# Patient Record
Sex: FEMALE
[De-identification: names, ages, dates, MRNs, and addresses within clinical notes are randomized per-mention and may not be internally consistent; named-entity substitution may affect disease eponyms.]

---

## 2019-05-21 ENCOUNTER — HOSPITAL ENCOUNTER (OUTPATIENT)
Age: 49
End: 2019-05-21
Payer: MEDICAID

## 2019-05-21 DIAGNOSIS — H53.8: Primary | ICD-10-CM

## 2019-05-21 DIAGNOSIS — Z82.49: ICD-10-CM

## 2019-05-21 DIAGNOSIS — R51: ICD-10-CM

## 2019-05-21 PROCEDURE — 70544 MR ANGIOGRAPHY HEAD W/O DYE: CPT

## 2023-09-19 ENCOUNTER — TELEPHONE (OUTPATIENT)
Dept: PRIMARY CARE | Facility: CLINIC | Age: 53
End: 2023-09-19
Payer: COMMERCIAL

## 2023-09-19 DIAGNOSIS — R79.89 ELEVATED FERRITIN: ICD-10-CM

## 2023-09-19 DIAGNOSIS — E78.1 HYPERTRIGLYCERIDEMIA: ICD-10-CM

## 2023-09-19 DIAGNOSIS — R79.89 LOW VITAMIN B12 LEVEL: Primary | ICD-10-CM

## 2023-09-22 ENCOUNTER — LAB (OUTPATIENT)
Dept: LAB | Facility: LAB | Age: 53
End: 2023-09-22
Payer: COMMERCIAL

## 2023-09-22 DIAGNOSIS — E78.1 HYPERTRIGLYCERIDEMIA: ICD-10-CM

## 2023-09-22 DIAGNOSIS — R79.89 ELEVATED FERRITIN: ICD-10-CM

## 2023-09-22 DIAGNOSIS — R79.89 LOW VITAMIN B12 LEVEL: ICD-10-CM

## 2023-09-22 LAB
ALANINE AMINOTRANSFERASE (SGPT) (U/L) IN SER/PLAS: 67 U/L (ref 7–45)
ALBUMIN (G/DL) IN SER/PLAS: 4.3 G/DL (ref 3.4–5)
ALKALINE PHOSPHATASE (U/L) IN SER/PLAS: 128 U/L (ref 33–110)
ANION GAP IN SER/PLAS: 12 MMOL/L (ref 10–20)
ASPARTATE AMINOTRANSFERASE (SGOT) (U/L) IN SER/PLAS: 37 U/L (ref 9–39)
BASOPHILS (10*3/UL) IN BLOOD BY AUTOMATED COUNT: 0.06 X10E9/L (ref 0–0.1)
BASOPHILS/100 LEUKOCYTES IN BLOOD BY AUTOMATED COUNT: 1 % (ref 0–2)
BILIRUBIN TOTAL (MG/DL) IN SER/PLAS: 0.5 MG/DL (ref 0–1.2)
CALCIUM (MG/DL) IN SER/PLAS: 9.1 MG/DL (ref 8.6–10.3)
CARBON DIOXIDE, TOTAL (MMOL/L) IN SER/PLAS: 26 MMOL/L (ref 21–32)
CHLORIDE (MMOL/L) IN SER/PLAS: 107 MMOL/L (ref 98–107)
CHOLESTEROL (MG/DL) IN SER/PLAS: 168 MG/DL (ref 0–199)
CHOLESTEROL IN HDL (MG/DL) IN SER/PLAS: 58 MG/DL
CHOLESTEROL/HDL RATIO: 2.9
COBALAMIN (VITAMIN B12) (PG/ML) IN SER/PLAS: 872 PG/ML (ref 211–911)
CREATININE (MG/DL) IN SER/PLAS: 0.57 MG/DL (ref 0.5–1.05)
EOSINOPHILS (10*3/UL) IN BLOOD BY AUTOMATED COUNT: 0.26 X10E9/L (ref 0–0.7)
EOSINOPHILS/100 LEUKOCYTES IN BLOOD BY AUTOMATED COUNT: 4.5 % (ref 0–6)
ERYTHROCYTE DISTRIBUTION WIDTH (RATIO) BY AUTOMATED COUNT: 12 % (ref 11.5–14.5)
ERYTHROCYTE MEAN CORPUSCULAR HEMOGLOBIN CONCENTRATION (G/DL) BY AUTOMATED: 32.5 G/DL (ref 32–36)
ERYTHROCYTE MEAN CORPUSCULAR VOLUME (FL) BY AUTOMATED COUNT: 96 FL (ref 80–100)
ERYTHROCYTES (10*6/UL) IN BLOOD BY AUTOMATED COUNT: 4.38 X10E12/L (ref 4–5.2)
FERRITIN (UG/LL) IN SER/PLAS: 179 UG/L (ref 8–150)
GFR FEMALE: >90 ML/MIN/1.73M2
GLUCOSE (MG/DL) IN SER/PLAS: 98 MG/DL (ref 74–99)
HEMATOCRIT (%) IN BLOOD BY AUTOMATED COUNT: 41.9 % (ref 36–46)
HEMOGLOBIN (G/DL) IN BLOOD: 13.6 G/DL (ref 12–16)
IMMATURE GRANULOCYTES/100 LEUKOCYTES IN BLOOD BY AUTOMATED COUNT: 0.2 % (ref 0–0.9)
IRON (UG/DL) IN SER/PLAS: 72 UG/DL (ref 35–150)
IRON BINDING CAPACITY (UG/DL) IN SER/PLAS: 301 UG/DL (ref 240–445)
IRON SATURATION (%) IN SER/PLAS: 24 % (ref 25–45)
LDL: 94 MG/DL (ref 0–99)
LEUKOCYTES (10*3/UL) IN BLOOD BY AUTOMATED COUNT: 5.7 X10E9/L (ref 4.4–11.3)
LYMPHOCYTES (10*3/UL) IN BLOOD BY AUTOMATED COUNT: 2.12 X10E9/L (ref 1.2–4.8)
LYMPHOCYTES/100 LEUKOCYTES IN BLOOD BY AUTOMATED COUNT: 37.1 % (ref 13–44)
MAGNESIUM (MG/DL) IN SER/PLAS: 1.92 MG/DL (ref 1.6–2.4)
MONOCYTES (10*3/UL) IN BLOOD BY AUTOMATED COUNT: 0.56 X10E9/L (ref 0.1–1)
MONOCYTES/100 LEUKOCYTES IN BLOOD BY AUTOMATED COUNT: 9.8 % (ref 2–10)
NEUTROPHILS (10*3/UL) IN BLOOD BY AUTOMATED COUNT: 2.71 X10E9/L (ref 1.2–7.7)
NEUTROPHILS/100 LEUKOCYTES IN BLOOD BY AUTOMATED COUNT: 47.4 % (ref 40–80)
PLATELETS (10*3/UL) IN BLOOD AUTOMATED COUNT: 313 X10E9/L (ref 150–450)
POTASSIUM (MMOL/L) IN SER/PLAS: 4.6 MMOL/L (ref 3.5–5.3)
PROTEIN TOTAL: 6.6 G/DL (ref 6.4–8.2)
SODIUM (MMOL/L) IN SER/PLAS: 140 MMOL/L (ref 136–145)
THYROTROPIN (MIU/L) IN SER/PLAS BY DETECTION LIMIT <= 0.05 MIU/L: 1.27 MIU/L (ref 0.44–3.98)
THYROXINE (T4) FREE (NG/DL) IN SER/PLAS: 0.82 NG/DL (ref 0.61–1.12)
TRIGLYCERIDE (MG/DL) IN SER/PLAS: 79 MG/DL (ref 0–149)
UREA NITROGEN (MG/DL) IN SER/PLAS: 15 MG/DL (ref 6–23)
VLDL: 16 MG/DL (ref 0–40)

## 2023-09-22 PROCEDURE — 83550 IRON BINDING TEST: CPT

## 2023-09-22 PROCEDURE — 85025 COMPLETE CBC W/AUTO DIFF WBC: CPT

## 2023-09-22 PROCEDURE — 84439 ASSAY OF FREE THYROXINE: CPT

## 2023-09-22 PROCEDURE — 36415 COLL VENOUS BLD VENIPUNCTURE: CPT

## 2023-09-22 PROCEDURE — 80061 LIPID PANEL: CPT

## 2023-09-22 PROCEDURE — 82607 VITAMIN B-12: CPT

## 2023-09-22 PROCEDURE — 83540 ASSAY OF IRON: CPT

## 2023-09-22 PROCEDURE — 84443 ASSAY THYROID STIM HORMONE: CPT

## 2023-09-22 PROCEDURE — 83735 ASSAY OF MAGNESIUM: CPT

## 2023-09-22 PROCEDURE — 80053 COMPREHEN METABOLIC PANEL: CPT

## 2023-09-22 PROCEDURE — 82728 ASSAY OF FERRITIN: CPT

## 2023-09-25 PROBLEM — F41.9 ANXIETY: Status: ACTIVE | Noted: 2023-09-25

## 2023-09-25 PROBLEM — K59.09 CHRONIC CONSTIPATION: Status: ACTIVE | Noted: 2021-12-13

## 2023-09-25 PROBLEM — N95.0 POSTMENOPAUSAL BLEEDING: Status: RESOLVED | Noted: 2021-12-13 | Resolved: 2023-09-25

## 2023-09-25 PROBLEM — M17.0 PRIMARY OSTEOARTHRITIS OF BOTH KNEES: Status: RESOLVED | Noted: 2019-07-01 | Resolved: 2023-09-25

## 2023-09-25 PROBLEM — M79.671 BILATERAL FOOT PAIN: Status: RESOLVED | Noted: 2019-07-01 | Resolved: 2023-09-25

## 2023-09-25 PROBLEM — M25.562 BILATERAL KNEE PAIN: Status: RESOLVED | Noted: 2019-07-01 | Resolved: 2023-09-25

## 2023-09-25 PROBLEM — M16.12 PRIMARY OSTEOARTHRITIS OF LEFT HIP: Status: RESOLVED | Noted: 2022-03-02 | Resolved: 2023-09-25

## 2023-09-25 PROBLEM — M51.369 LUMBAR DEGENERATIVE DISC DISEASE: Status: RESOLVED | Noted: 2019-07-01 | Resolved: 2023-09-25

## 2023-09-25 PROBLEM — M54.31 RIGHT SIDED SCIATICA: Status: RESOLVED | Noted: 2023-09-25 | Resolved: 2023-09-25

## 2023-09-25 PROBLEM — Z91.199 NONCOMPLIANCE: Status: RESOLVED | Noted: 2020-09-16 | Resolved: 2023-09-25

## 2023-09-25 PROBLEM — M25.561 BILATERAL KNEE PAIN: Status: RESOLVED | Noted: 2019-07-01 | Resolved: 2023-09-25

## 2023-09-25 PROBLEM — L81.1 CHLOASMA: Status: RESOLVED | Noted: 2019-07-24 | Resolved: 2023-09-25

## 2023-09-25 PROBLEM — R53.83 FATIGUE: Status: RESOLVED | Noted: 2019-07-01 | Resolved: 2023-09-25

## 2023-09-25 PROBLEM — R74.8 ELEVATED VITAMIN B12 LEVEL: Status: RESOLVED | Noted: 2023-09-25 | Resolved: 2023-09-25

## 2023-09-25 PROBLEM — M79.672 BILATERAL FOOT PAIN: Status: RESOLVED | Noted: 2019-07-01 | Resolved: 2023-09-25

## 2023-09-25 PROBLEM — M94.9 DISORDER OF BONE AND CARTILAGE: Status: ACTIVE | Noted: 2019-07-01

## 2023-09-25 PROBLEM — M79.642 BILATERAL HAND PAIN: Status: RESOLVED | Noted: 2019-07-01 | Resolved: 2023-09-25

## 2023-09-25 PROBLEM — M06.09 RHEUMATOID ARTHRITIS OF MULTIPLE SITES WITH NEGATIVE RHEUMATOID FACTOR (MULTI): Status: RESOLVED | Noted: 2022-03-02 | Resolved: 2023-09-25

## 2023-09-25 PROBLEM — R10.9 CHRONIC ABDOMINAL PAIN: Status: RESOLVED | Noted: 2023-09-25 | Resolved: 2023-09-25

## 2023-09-25 PROBLEM — M54.50 CHRONIC LOW BACK PAIN: Status: RESOLVED | Noted: 2021-12-13 | Resolved: 2023-09-25

## 2023-09-25 PROBLEM — R79.89 ELEVATED FERRITIN: Status: RESOLVED | Noted: 2023-09-25 | Resolved: 2023-09-25

## 2023-09-25 PROBLEM — M79.641 BILATERAL HAND PAIN: Status: RESOLVED | Noted: 2019-07-01 | Resolved: 2023-09-25

## 2023-09-25 PROBLEM — R73.9 HYPERGLYCEMIA: Status: RESOLVED | Noted: 2019-07-01 | Resolved: 2023-09-25

## 2023-09-25 PROBLEM — J30.9 ALLERGIC RHINITIS: Status: ACTIVE | Noted: 2021-12-13

## 2023-09-25 PROBLEM — M89.9 DISORDER OF BONE AND CARTILAGE: Status: ACTIVE | Noted: 2019-07-01

## 2023-09-25 PROBLEM — R79.89 ELEVATED VITAMIN B12 LEVEL: Status: RESOLVED | Noted: 2023-09-25 | Resolved: 2023-09-25

## 2023-09-25 PROBLEM — E66.811 OBESITY (BMI 30.0-34.9): Status: ACTIVE | Noted: 2019-07-01

## 2023-09-25 PROBLEM — H66.91 ACUTE RIGHT OTITIS MEDIA: Status: RESOLVED | Noted: 2021-12-13 | Resolved: 2023-09-25

## 2023-09-25 PROBLEM — E66.9 OBESITY (BMI 30.0-34.9): Status: ACTIVE | Noted: 2019-07-01

## 2023-09-25 PROBLEM — R87.615 CERVICOVAGINAL CYTOLOGY SPECIMEN UNSATISFACTORY: Status: RESOLVED | Noted: 2023-09-25 | Resolved: 2023-09-25

## 2023-09-25 PROBLEM — M54.16 LUMBAR RADICULOPATHY: Status: RESOLVED | Noted: 2023-09-25 | Resolved: 2023-09-25

## 2023-09-25 PROBLEM — M25.532 BILATERAL WRIST PAIN: Status: RESOLVED | Noted: 2019-07-01 | Resolved: 2023-09-25

## 2023-09-25 PROBLEM — Z96.641 H/O TOTAL HIP ARTHROPLASTY, RIGHT: Status: RESOLVED | Noted: 2022-03-02 | Resolved: 2023-09-25

## 2023-09-25 PROBLEM — I65.29 STENOSIS OF CAROTID ARTERY: Status: ACTIVE | Noted: 2021-12-13

## 2023-09-25 PROBLEM — H04.123 DRY EYES: Status: RESOLVED | Noted: 2022-03-02 | Resolved: 2023-09-25

## 2023-09-25 PROBLEM — K76.0 FATTY LIVER: Status: RESOLVED | Noted: 2019-07-01 | Resolved: 2023-09-25

## 2023-09-25 PROBLEM — M79.7 FIBROMYALGIA: Status: RESOLVED | Noted: 2023-09-25 | Resolved: 2023-09-25

## 2023-09-25 PROBLEM — M51.36 LUMBAR DEGENERATIVE DISC DISEASE: Status: RESOLVED | Noted: 2019-07-01 | Resolved: 2023-09-25

## 2023-09-25 PROBLEM — M76.71 PERONEAL TENDONITIS OF RIGHT LOWER EXTREMITY: Status: RESOLVED | Noted: 2021-09-03 | Resolved: 2023-09-25

## 2023-09-25 PROBLEM — F41.1 GENERALIZED ANXIETY DISORDER: Status: ACTIVE | Noted: 2021-12-13

## 2023-09-25 PROBLEM — G89.29 CHRONIC LOW BACK PAIN: Status: RESOLVED | Noted: 2021-12-13 | Resolved: 2023-09-25

## 2023-09-25 PROBLEM — K75.81 NASH (NONALCOHOLIC STEATOHEPATITIS): Status: RESOLVED | Noted: 2019-07-01 | Resolved: 2023-09-25

## 2023-09-25 PROBLEM — M25.551 BILATERAL HIP PAIN: Status: RESOLVED | Noted: 2019-07-01 | Resolved: 2023-09-25

## 2023-09-25 PROBLEM — M25.552 BILATERAL HIP PAIN: Status: RESOLVED | Noted: 2019-07-01 | Resolved: 2023-09-25

## 2023-09-25 PROBLEM — K64.4 EXTERNAL HEMORRHOIDS: Status: RESOLVED | Noted: 2021-12-13 | Resolved: 2023-09-25

## 2023-09-25 PROBLEM — E78.1 HYPERTRIGLYCERIDEMIA: Status: RESOLVED | Noted: 2021-12-13 | Resolved: 2023-09-25

## 2023-09-25 PROBLEM — I49.1 PREMATURE ATRIAL CONTRACTION: Status: RESOLVED | Noted: 2021-12-13 | Resolved: 2023-09-25

## 2023-09-25 PROBLEM — M25.531 BILATERAL WRIST PAIN: Status: RESOLVED | Noted: 2019-07-01 | Resolved: 2023-09-25

## 2023-09-25 PROBLEM — M54.10 BILATERAL RADIATING LEG PAIN: Status: RESOLVED | Noted: 2023-09-25 | Resolved: 2023-09-25

## 2023-09-25 PROBLEM — G89.29 CHRONIC ABDOMINAL PAIN: Status: RESOLVED | Noted: 2023-09-25 | Resolved: 2023-09-25

## 2023-09-25 PROBLEM — M16.11 OSTEOARTHRITIS OF RIGHT HIP: Status: RESOLVED | Noted: 2023-09-25 | Resolved: 2023-09-25

## 2023-09-25 PROBLEM — R79.89 ELEVATED LFTS: Status: RESOLVED | Noted: 2023-09-25 | Resolved: 2023-09-25

## 2023-09-25 PROBLEM — R89.8 ABNORMAL CHROMOSOMAL ANALYSIS: Status: ACTIVE | Noted: 2023-09-25

## 2023-09-25 RX ORDER — CYCLOBENZAPRINE HCL 10 MG
1 TABLET ORAL 2 TIMES DAILY PRN
COMMUNITY
Start: 2021-02-17

## 2023-09-25 RX ORDER — HYDROXYCHLOROQUINE SULFATE 200 MG/1
1 TABLET, FILM COATED ORAL 2 TIMES DAILY
COMMUNITY
Start: 2019-09-06

## 2023-09-25 RX ORDER — FLUTICASONE PROPIONATE 50 MCG
1 SPRAY, SUSPENSION (ML) NASAL DAILY
COMMUNITY
Start: 2019-05-03

## 2023-09-25 RX ORDER — LANSOPRAZOLE 30 MG/1
1 CAPSULE, DELAYED RELEASE ORAL DAILY
COMMUNITY
Start: 2019-05-20 | End: 2024-01-02 | Stop reason: SDUPTHER

## 2023-09-25 RX ORDER — SULINDAC 200 MG/1
200 TABLET ORAL EVERY 12 HOURS
COMMUNITY
Start: 2019-07-01

## 2023-09-25 RX ORDER — DOCUSATE SODIUM 100 MG/1
100 CAPSULE, LIQUID FILLED ORAL 2 TIMES DAILY
COMMUNITY
Start: 2021-12-28

## 2023-09-28 ENCOUNTER — APPOINTMENT (OUTPATIENT)
Dept: PRIMARY CARE | Facility: CLINIC | Age: 53
End: 2023-09-28
Payer: COMMERCIAL

## 2023-09-29 ENCOUNTER — OFFICE VISIT (OUTPATIENT)
Dept: PRIMARY CARE | Facility: CLINIC | Age: 53
End: 2023-09-29
Payer: COMMERCIAL

## 2023-09-29 VITALS
OXYGEN SATURATION: 96 % | WEIGHT: 146 LBS | HEART RATE: 63 BPM | DIASTOLIC BLOOD PRESSURE: 80 MMHG | SYSTOLIC BLOOD PRESSURE: 122 MMHG | HEIGHT: 60 IN | BODY MASS INDEX: 28.66 KG/M2

## 2023-09-29 DIAGNOSIS — B35.1 ONYCHOMYCOSIS: Primary | ICD-10-CM

## 2023-09-29 DIAGNOSIS — F41.1 GENERALIZED ANXIETY DISORDER: ICD-10-CM

## 2023-09-29 PROCEDURE — 99214 OFFICE O/P EST MOD 30 MIN: CPT | Performed by: NURSE PRACTITIONER

## 2023-09-29 PROCEDURE — 1036F TOBACCO NON-USER: CPT | Performed by: NURSE PRACTITIONER

## 2023-09-29 RX ORDER — TERBINAFINE HYDROCHLORIDE 250 MG/1
250 TABLET ORAL DAILY
Qty: 84 TABLET | Refills: 0 | Status: SHIPPED | OUTPATIENT
Start: 2023-09-29 | End: 2023-12-22

## 2023-09-29 ASSESSMENT — ENCOUNTER SYMPTOMS
CHOKING: 0
NECK PAIN: 0
NERVOUS/ANXIOUS: 0
DYSURIA: 0
SPEECH DIFFICULTY: 0
EYE PAIN: 0
DIZZINESS: 0
EYE REDNESS: 0
PHOTOPHOBIA: 0
SORE THROAT: 0
FATIGUE: 0
VOMITING: 0
CONSTIPATION: 0
APNEA: 0
PALPITATIONS: 0
FREQUENCY: 0
BLOOD IN STOOL: 0
CHILLS: 0
HEMATURIA: 0
WEAKNESS: 0
TROUBLE SWALLOWING: 0
NAUSEA: 0
FLANK PAIN: 0
CHEST TIGHTNESS: 0
DIARRHEA: 0
ABDOMINAL DISTENTION: 0
SLEEP DISTURBANCE: 0
CONFUSION: 0
BACK PAIN: 0
ABDOMINAL PAIN: 0
JOINT SWELLING: 0
COUGH: 0
WHEEZING: 0
SHORTNESS OF BREATH: 0
ARTHRALGIAS: 0
WOUND: 0
UNEXPECTED WEIGHT CHANGE: 0
SEIZURES: 0
DIFFICULTY URINATING: 0
NUMBNESS: 0
HEADACHES: 0
MYALGIAS: 0
FEVER: 0
FACIAL ASYMMETRY: 0

## 2023-09-29 ASSESSMENT — PATIENT HEALTH QUESTIONNAIRE - PHQ9
2. FEELING DOWN, DEPRESSED OR HOPELESS: NOT AT ALL
SUM OF ALL RESPONSES TO PHQ9 QUESTIONS 1 AND 2: 0
1. LITTLE INTEREST OR PLEASURE IN DOING THINGS: NOT AT ALL

## 2023-09-29 NOTE — PROGRESS NOTES
Subjective   Patient ID: Irma D Reyes is a 53 y.o. female who presents for Nail Problem (X a few months ).      HPI:  Presents today for [facility] LAB FU. C/O FINGERNAIL  modifying factors consists of SHE WENT TO URGENT CARE AND DX WITH  associated symptoms consist of CRACKED AND BRITTLE NAILS  prior treatment consists of medication OTC TOPICAL       LIVER ENZYMES- STATES SHE HAS A FATTY LIVER. REFUSING ABD US OR REPEAT LABS AT THIS TIME    Visit Vitals  /80 (BP Location: Right arm, Patient Position: Sitting)   Pulse 63   Ht 1.524 m (5')   Wt 66.2 kg (146 lb)   LMP  (LMP Unknown)   SpO2 96%   BMI 28.51 kg/m²   OB Status Perimenopausal   Smoking Status Never   BSA 1.67 m²        Review of Systems   Constitutional:  Negative for chills, fatigue, fever and unexpected weight change.   HENT:  Negative for congestion, ear pain, sore throat and trouble swallowing.    Eyes:  Negative for photophobia, pain, redness and visual disturbance.   Respiratory:  Negative for apnea, cough, choking, chest tightness, shortness of breath and wheezing.    Cardiovascular:  Negative for chest pain, palpitations and leg swelling.   Gastrointestinal:  Negative for abdominal distention, abdominal pain, blood in stool, constipation, diarrhea, nausea and vomiting.   Genitourinary:  Negative for difficulty urinating, dysuria, flank pain, frequency, hematuria and urgency.   Musculoskeletal:  Negative for arthralgias, back pain, gait problem, joint swelling, myalgias and neck pain.   Skin:  Negative for rash and wound.   Neurological:  Negative for dizziness, seizures, syncope, facial asymmetry, speech difficulty, weakness, numbness and headaches.   Psychiatric/Behavioral:  Negative for confusion, sleep disturbance and suicidal ideas. The patient is not nervous/anxious.        Objective   Component      Latest Ref Rng 9/22/2023   WBC      4.4 - 11.3 x10E9/L 5.7    RBC      4.00 - 5.20 x10E12/L 4.38    HEMOGLOBIN      12.0 - 16.0 g/dL 13.6     HEMATOCRIT      36.0 - 46.0 % 41.9    MCV      80 - 100 fL 96    MCHC      32.0 - 36.0 g/dL 32.5    Platelets      150 - 450 x10E9/L 313    RED CELL DISTRIBUTION WIDTH      11.5 - 14.5 % 12.0    Neutrophils %      40.0 - 80.0 % 47.4    Immature Granulocytes %, Automated      0.0 - 0.9 % 0.2    Lymphocytes %      13.0 - 44.0 % 37.1    Monocytes %      2.0 - 10.0 % 9.8    Eosinophils %      0.0 - 6.0 % 4.5    Basophils %      0.0 - 2.0 % 1.0    Neutrophils Absolute      1.20 - 7.70 x10E9/L 2.71    Lymphocytes Absolute      1.20 - 4.80 x10E9/L 2.12    Monocytes Absolute      0.10 - 1.00 x10E9/L 0.56    Eosinophils Absolute      0.00 - 0.70 x10E9/L 0.26    Basophils Absolute      0.00 - 0.10 x10E9/L 0.06    GLUCOSE      74 - 99 mg/dL 98    SODIUM      136 - 145 mmol/L 140    POTASSIUM      3.5 - 5.3 mmol/L 4.6    CHLORIDE      98 - 107 mmol/L 107    Bicarbonate      21 - 32 mmol/L 26    Anion Gap      10 - 20 mmol/L 12    Blood Urea Nitrogen      6 - 23 mg/dL 15    Creatinine      0.50 - 1.05 mg/dL 0.57    GFR Female      >90 mL/min/1.73m2 >90    Calcium      8.6 - 10.3 mg/dL 9.1    Albumin      3.4 - 5.0 g/dL 4.3    Alkaline Phosphatase      33 - 110 U/L 128 (H)    Total Protein      6.4 - 8.2 g/dL 6.6    AST      9 - 39 U/L 37    Bilirubin Total      0.0 - 1.2 mg/dL 0.5    ALT      7 - 45 U/L 67 (H)    CHOLESTEROL      0 - 199 mg/dL 168    HDL CHOLESTEROL      mg/dL 58.0    Cholesterol/HDL Ratio 2.9    LDL      0 - 99 mg/dL 94    VLDL      0 - 40 mg/dL 16    TRIGLYCERIDES      0 - 149 mg/dL 79    IRON      35 - 150 ug/dL 72    TIBC      240 - 445 ug/dL 301    % Saturation      25 - 45 % 24 (L)    Thyroid Stimulating Hormone      0.44 - 3.98 mIU/L 1.27    Thyroxine, Free      0.61 - 1.12 ng/dL 0.82    FERRITIN      8 - 150 ug/L 179 (H)    MAGNESIUM      1.60 - 2.40 mg/dL 1.92    Vitamin B12      211 - 911 pg/mL 872       Legend:  (H) High  (L) Low  Physical Exam  Constitutional:       Appearance: Normal appearance.  She is normal weight.   HENT:      Head: Normocephalic.   Eyes:      Extraocular Movements: Extraocular movements intact.      Conjunctiva/sclera: Conjunctivae normal.      Pupils: Pupils are equal, round, and reactive to light.   Cardiovascular:      Rate and Rhythm: Normal rate and regular rhythm.      Pulses: Normal pulses.      Heart sounds: Normal heart sounds.   Pulmonary:      Effort: Pulmonary effort is normal.      Breath sounds: Normal breath sounds.   Musculoskeletal:         General: Normal range of motion.      Cervical back: Normal range of motion.   Skin:     General: Skin is warm and dry.      Comments: BRITTLE NAILS. CRACKED. YELLOWISH IN COLOR    Neurological:      General: No focal deficit present.      Mental Status: She is alert and oriented to person, place, and time.   Psychiatric:         Mood and Affect: Mood normal.         Behavior: Behavior normal.         Thought Content: Thought content normal.         Judgment: Judgment normal.            Assessment/Plan   Problem List Items Addressed This Visit       Onychomycosis - Primary    Relevant Medications    terbinafine (LamISIL) 250 mg tablet       WE DISCUSSED MOST COMMON SIDE EFFECTS OF PRESCRIBED MEDICATIONS. INDICATIONS, RISK, COMPLICATIONS, AND ALTERNATIVES OF MEDICATION/THERAPEUTICS WERE EXPLAINED AND DISCUSSED. PLEASE MONITOR CLOSELY FOR ANY UNTOWARD SIDE EFFECTS OR COMPLICATIONS OF MEDICATIONS. PATIENT IS STRONGLY ADVISED TO BE COMPLIANT WITH RECOMMENDATIONS. QUESTIONS AND CONCERNS WERE ADDRESSED. INSTRUCTED TO CALL, RETURN SOONER, OR GO TO THE ER,  IF SYMPTOMS PERSIST OR WORSEN. THEY VOICED UNDERSTANDING AND  DENIES FURTHER QUESTIONS AT THIS TIME.  .  TIME CODE  1. PREPARATION FOR PATIENT'S VISIT (REVIEWING CHART, CURRENT MEDICAL RECORDS, OUTSIDE HEALTH PROVIDER RECORDS, PREVIOUS HISTORY, EXAM, TEST, PROCEDURE, AND MEDICATIONS)  2. FACE TO FACE ENCOUNTER OBTAINING HISTORY FROM THE PATIENT/FAMILY/CAREGIVERS; PERFORMING EVALUATION AND  EXAMINATION; ORDERING TESTS OR PROCEDURES; REFERRING AND COMMUNICATING WITH OTHER HEALTHCARE PROVIDERS; COUNSELING AND EDUCATION OF THE PATIENT/FAMILY/CAREGIVERS; INDEPENDENTLY INTERPRETING RESULTS (TESTS, LABS, PROCEDURES, IMAGING) AND COMMUNICATING AND EXPLAINING RESULTS TO THE PATIENT/FAMILY/CAREGIVERS  3. COORDINATION OF CARE; PREPARING AND PRINTING DISCHARGE INSTRUCTIONS AND ANY EDUCATIONAL MATERIAL FOR THE PATIENT/FAMILY/CAREGIVERS. DOCUMENTING CLINICAL INFORMATION IN THE ELECTRONIC MEDICAL RECORD   4. REVIEWING OARRS AS NEEDED      MDM  1) COMPLEXITY: MORE THAN 1 STABLE CHRONIC CONDITION ADDRESSED OR 1 ACUTE ILLNESS ADDRESSED   2)DATA: TESTS INTERPRETED AND OR ORDERED, TOOK INDEPENDENT HISTORY OR RECORDS REVIEWED  3)RISK: MODERATE RISK DUE TO NATURE OF MEDICAL CONDITIONS/COMORBIDITY OR MEDICATIONS ORDERED OR SURGICAL OR PROCEDURE REFERRAL    12 MONTHS WITH RM

## 2023-11-01 NOTE — TELEPHONE ENCOUNTER
She has not been seen in almost 2 years   I just put in basic lab orders - fasting   Thanks    Alan Craft decreased balance during turns/decreased step length/decreased weight-shifting ability

## 2024-01-02 ENCOUNTER — OFFICE VISIT (OUTPATIENT)
Dept: PRIMARY CARE | Facility: CLINIC | Age: 54
End: 2024-01-02
Payer: COMMERCIAL

## 2024-01-02 VITALS
BODY MASS INDEX: 30.04 KG/M2 | SYSTOLIC BLOOD PRESSURE: 124 MMHG | HEART RATE: 70 BPM | HEIGHT: 60 IN | WEIGHT: 153 LBS | DIASTOLIC BLOOD PRESSURE: 82 MMHG

## 2024-01-02 DIAGNOSIS — R22.2 MASS OF BUTTOCK: Primary | ICD-10-CM

## 2024-01-02 DIAGNOSIS — B35.1 ONYCHOMYCOSIS: ICD-10-CM

## 2024-01-02 DIAGNOSIS — Z71.3 ENCOUNTER FOR DIETARY COUNSELING AND SURVEILLANCE: ICD-10-CM

## 2024-01-02 DIAGNOSIS — K76.0 FATTY LIVER: ICD-10-CM

## 2024-01-02 DIAGNOSIS — E66.3 OVERWEIGHT (BMI 25.0-29.9): ICD-10-CM

## 2024-01-02 DIAGNOSIS — K21.9 GASTROESOPHAGEAL REFLUX DISEASE, UNSPECIFIED WHETHER ESOPHAGITIS PRESENT: ICD-10-CM

## 2024-01-02 DIAGNOSIS — M25.551 RIGHT HIP PAIN: ICD-10-CM

## 2024-01-02 PROCEDURE — 1036F TOBACCO NON-USER: CPT | Performed by: PHYSICIAN ASSISTANT

## 2024-01-02 PROCEDURE — 99214 OFFICE O/P EST MOD 30 MIN: CPT | Performed by: PHYSICIAN ASSISTANT

## 2024-01-02 RX ORDER — LANSOPRAZOLE 30 MG/1
30 CAPSULE, DELAYED RELEASE ORAL DAILY
Qty: 30 CAPSULE | Refills: 5 | Status: SHIPPED | OUTPATIENT
Start: 2024-01-02

## 2024-01-02 RX ORDER — SEMAGLUTIDE 0.25 MG/.5ML
0.25 INJECTION, SOLUTION SUBCUTANEOUS
Qty: 2 ML | Refills: 1 | Status: SHIPPED | OUTPATIENT
Start: 2024-01-02 | End: 2024-03-05 | Stop reason: ALTCHOICE

## 2024-01-02 ASSESSMENT — ENCOUNTER SYMPTOMS
ARTHRALGIAS: 1
PALPITATIONS: 0
SHORTNESS OF BREATH: 0
NAUSEA: 0
DIARRHEA: 0
EYE DISCHARGE: 0
CONSTIPATION: 0
SLEEP DISTURBANCE: 0
NECK PAIN: 0
CONFUSION: 0
BACK PAIN: 0
EYE REDNESS: 0
HEADACHES: 0
BRUISES/BLEEDS EASILY: 0
COUGH: 0
FEVER: 0
TREMORS: 0
DIZZINESS: 0
FLANK PAIN: 0
FATIGUE: 1
WOUND: 0
FREQUENCY: 0
RHINORRHEA: 0
SINUS PAIN: 0
CHILLS: 0
VOMITING: 0
WHEEZING: 0
SORE THROAT: 0
ABDOMINAL PAIN: 0
NUMBNESS: 0
CHEST TIGHTNESS: 0

## 2024-01-02 ASSESSMENT — PATIENT HEALTH QUESTIONNAIRE - PHQ9
SUM OF ALL RESPONSES TO PHQ9 QUESTIONS 1 AND 2: 0
2. FEELING DOWN, DEPRESSED OR HOPELESS: NOT AT ALL
1. LITTLE INTEREST OR PLEASURE IN DOING THINGS: NOT AT ALL

## 2024-01-02 NOTE — PROGRESS NOTES
Subjective   Patient ID: Irma D Reyes is a 53 y.o. female who presents for Follow-up (C/O LUMP R HIP AREA X 3 WKS NOW. SOME LOW BACK PAIN OR PAIN WHERE LUMP IS. S/P R HIP REPLACEMENT X 3 YEARS AGO; C/O NAIL FUNGUS REMAINS ON R THUMB -SHE HAD SEEN RYAN IN 9/2023 FOR THIS   )    HPI  Patient presents today for     1 R hip lump x 3 weeks   Low back pain   S/P R hip replacement about 2-3 years ago     2 nail concerns   She was on lamisil  end of sept 2023 started with ryan segura   Given a script for about 3 months - slight improvement but did not fully resolve.  I am hesitant to cont the meds given her fatty liver history     3 med check   iron - not on meds   b12 - not on meds   RA - following with specialists on occasion - - ran our of the sulindac and hydroxyzchlorquine - but will follow up with rheum   elevated LFT/fatty liver- did follow with Dr Bronson   Dyspepsia - on prevacid    4 overweight - discussed diet and ex, referral to dietician, medications  Pt would like to start injectable.  I explained some of the meds are not recommended for her as she is not a diabetic.  We can try wegovey given her BMI>27.5 and she does have fatty liver but explained this may not be covered by insurance.  I explained it was a titration medicine and she would be seen routinely for wt checks and med inc in the beginning     Preventative testing   Mammo  PAP   DEXA   Colonoscopy   Fall - NEG Jan 2024   PHQ2 NEG JAN 2024         Patient Active Problem List   Diagnosis    Abnormal chromosomal analysis    Allergic rhinitis    Anxiety    Chronic constipation    Disorder of bone and cartilage    Generalized anxiety disorder    Obesity (BMI 30.0-34.9)    Stenosis of carotid artery    Onychomycosis       Review of Systems   Constitutional:  Positive for fatigue. Negative for chills and fever.   HENT:  Negative for congestion, rhinorrhea, sinus pain, sore throat and tinnitus.    Eyes:  Negative for discharge, redness and visual disturbance.    Respiratory:  Negative for cough, chest tightness, shortness of breath and wheezing.    Cardiovascular:  Negative for chest pain, palpitations and leg swelling.   Gastrointestinal:  Negative for abdominal pain, constipation, diarrhea, nausea and vomiting.   Endocrine: Negative for cold intolerance and heat intolerance.   Genitourinary:  Negative for flank pain, frequency and urgency.   Musculoskeletal:  Positive for arthralgias. Negative for back pain, gait problem and neck pain.   Skin:  Negative for rash and wound.        Lump in R buttock    Neurological:  Negative for dizziness, tremors, syncope, numbness and headaches.   Hematological:  Does not bruise/bleed easily.   Psychiatric/Behavioral:  Negative for confusion, sleep disturbance and suicidal ideas.        Past Medical History:   Diagnosis Date    Bilateral foot pain 07/01/2019    Bilateral hand pain 07/01/2019    Bilateral hip pain 07/01/2019    Bilateral wrist pain 07/01/2019    Cervicovaginal cytology specimen unsatisfactory 09/25/2023    Chronic abdominal pain 09/25/2023    Chronic low back pain 12/13/2021    Dry eyes 03/02/2022    Elevated ferritin 09/25/2023    Elevated LFTs 09/25/2023    Elevated vitamin B12 level 09/25/2023    Encounter for gynecological examination (general) (routine) without abnormal findings     Pap test, as part of routine gynecological examination    External hemorrhoids 12/13/2021    Fatigue 07/01/2019    Fatty liver 07/01/2019    Fibromyalgia 09/25/2023    H/O total hip arthroplasty, right 03/02/2022    Hypertriglyceridemia 12/13/2021    Immunization not carried out because of patient refusal     Influenza vaccination declined    Lumbar degenerative disc disease 07/01/2019    Lumbar radiculopathy 09/25/2023    MCCULLOUGH (nonalcoholic steatohepatitis) 07/01/2019    Noncompliance 09/16/2020    Osteoarthritis of right hip 09/25/2023    Other conditions influencing health status     History of pregnancy    Peroneal tendonitis of  right lower extremity 09/03/2021    Postmenopausal bleeding 12/13/2021    Premature atrial contraction 12/13/2021    Primary osteoarthritis of both knees 07/01/2019    Primary osteoarthritis of left hip 03/02/2022    Rheumatoid arthritis of multiple sites with negative rheumatoid factor (CMS/HCC) 03/02/2022    Right sided sciatica 09/25/2023       Past Surgical History:   Procedure Laterality Date    COLONOSCOPY  12/07/2017    POLYP- 3 YRS    ENDOMETRIAL BIOPSY  2018       Family History   Problem Relation Name Age of Onset    Aneurysm Mother      Hypertension Mother      No Known Problems Father      Cervical cancer Other          GRANDPARENT       Social History     Tobacco Use    Smoking status: Never    Smokeless tobacco: Never   Vaping Use    Vaping Use: Never used   Substance Use Topics    Alcohol use: Yes     Comment: occasionally    Drug use: Never       Allergies   Allergen Reactions    Aspirin-Acetaminophen-Caffeine Palpitations    Diphenhydramine Other, Palpitations and Unknown     Anxiety    Other reaction(s): Other: See Comments   Anxiety    Anxiety   Other reaction(s): Other: See Comments   Anxiety    Caffeine Other and Unknown     anxiety    Ciprofloxacin Unknown    Ragweed Unknown       Current Outpatient Medications   Medication Sig Dispense Refill    cyclobenzaprine (Flexeril) 10 mg tablet Take 1 tablet (10 mg) by mouth 2 times a day as needed for muscle spasms.      docusate sodium (Colace) 100 mg capsule Take 1 capsule (100 mg) by mouth twice a day.      fluticasone (Flonase) 50 mcg/actuation nasal spray Administer 1 spray into affected nostril(s) once daily.      lansoprazole (Prevacid) 30 mg DR capsule Take 1 capsule (30 mg) by mouth once daily.      sulindac (Clinoril) 200 mg tablet Take 1 tablet (200 mg) by mouth every 12 hours.      hydroxychloroquine (Plaquenil) 200 mg tablet Take 1 tablet (200 mg) by mouth 2 times a day.       No current facility-administered medications for this visit.        Objective   /82   Pulse 70   Ht 1.524 m (5')   Wt 69.4 kg (153 lb)   BMI 29.88 kg/m²     Physical Exam  Vitals reviewed.   Constitutional:       Appearance: Normal appearance.   HENT:      Head: Normocephalic.      Right Ear: External ear normal.      Left Ear: External ear normal.      Nose: Nose normal. No congestion or rhinorrhea.      Mouth/Throat:      Mouth: Mucous membranes are moist.   Eyes:      Extraocular Movements: Extraocular movements intact.      Conjunctiva/sclera: Conjunctivae normal.      Pupils: Pupils are equal, round, and reactive to light.   Cardiovascular:      Rate and Rhythm: Normal rate and regular rhythm.      Pulses: Normal pulses.   Pulmonary:      Effort: Pulmonary effort is normal.      Breath sounds: Normal breath sounds.   Abdominal:      General: Bowel sounds are normal.      Palpations: Abdomen is soft.      Tenderness: There is no abdominal tenderness. There is no right CVA tenderness or left CVA tenderness.   Musculoskeletal:         General: No tenderness. Normal range of motion.      Cervical back: Normal range of motion and neck supple. No tenderness.      Comments: R buttock mass just smaller than a golf ball but not perfectly round either   Deep in the tissue    Skin:     General: Skin is warm and dry.      Comments: Thickening and yellow discoloration of the nails    Neurological:      General: No focal deficit present.      Mental Status: She is alert and oriented to person, place, and time.   Psychiatric:         Mood and Affect: Mood normal.         Behavior: Behavior normal.       Testing   Reviewed labs on file     Impression    MDM    1) COMPLEXITY: 1 UNDIAGNOSED NEW PROBLEM WITH UNCERTAIN PROGNOSIS  2)DATA: TESTS INTERPRETED AND OR ORDERED, TOOK INDEPENDENT HISTORY OR RECORDS REVIEWED  3)RISK: MODERATE RISK DUE TO NATURE OF MEDICAL CONDITIONS/COMORBIDITY OR MEDICATIONS ORDERED OR SURGICAL OR PROCEDURE REFERRAL, .       Reviewed labs and Testing on  file   Patient to follow diet low in cholesterol, fat, and sodium.    Patient is advised to increase Exercise.  Patient is recommended to lose weight.  Reviewed Meds and discussed common side effects  Continue as directed   Buttock mass - suspect lipoma but with her hx of surgery suggest further imaging - consider need for Ct/MRI   Nail fungal concerns and with her hx of fatty liver and limited improvement on oral meds - will start referral to pod   I question if some of the nails may need removed   Weight loss - start on wegovey and follow monthly   Patient is strongly advised to be compliant with recommendations.    Return to Clinic sooner if needed.  Patient denies further questions/concerns at this time     Assessment/Plan   Problem List Items Addressed This Visit             ICD-10-CM    Overweight (BMI 25.0-29.9) E66.3    Relevant Medications    semaglutide, weight loss, (Wegovy) 0.25 mg/0.5 mL pen injector    Onychomycosis B35.1    Relevant Orders    Referral to Podiatry     Other Visit Diagnoses         Codes    Mass of buttock    -  Primary R22.2    Relevant Orders    US extremity nonvascular real time w image documentation limited anatomic specific    Gastroesophageal reflux disease, unspecified whether esophagitis present     K21.9    Relevant Medications    lansoprazole (Prevacid) 30 mg DR capsule    Right hip pain     M25.551    Relevant Orders    XR hip right with pelvis when performed 2 or 3 views    Encounter for dietary counseling and surveillance     Z71.3    Relevant Medications    semaglutide, weight loss, (Wegovy) 0.25 mg/0.5 mL pen injector    Fatty liver     K76.0    Relevant Medications    semaglutide, weight loss, (Wegovy) 0.25 mg/0.5 mL pen injector             FU in 1 mo with weight check /med check   Imaging   Referral to pod

## 2024-01-09 ENCOUNTER — HOSPITAL ENCOUNTER (OUTPATIENT)
Dept: RADIOLOGY | Facility: HOSPITAL | Age: 54
Discharge: HOME | End: 2024-01-09
Payer: COMMERCIAL

## 2024-01-09 DIAGNOSIS — R22.2 MASS OF BUTTOCK: ICD-10-CM

## 2024-01-09 PROCEDURE — 76882 US LMTD JT/FCL EVL NVASC XTR: CPT | Performed by: RADIOLOGY

## 2024-01-09 PROCEDURE — 76882 US LMTD JT/FCL EVL NVASC XTR: CPT

## 2024-01-11 ENCOUNTER — TELEPHONE (OUTPATIENT)
Dept: PRIMARY CARE | Facility: CLINIC | Age: 54
End: 2024-01-11
Payer: COMMERCIAL

## 2024-01-11 DIAGNOSIS — R22.2 MASS OF BUTTOCK: Primary | ICD-10-CM

## 2024-01-11 DIAGNOSIS — M25.551 RIGHT HIP PAIN: ICD-10-CM

## 2024-01-11 DIAGNOSIS — R93.89 ABNORMAL ULTRASOUND: ICD-10-CM

## 2024-01-12 NOTE — TELEPHONE ENCOUNTER
IMPRESSION:  Hypoechoic right gluteal region soft tissue abnormality. This is not  a classic lipoma. Lipomas are typically hyperechoic. This is somewhat  ill-defined and hypoechoic. Further evaluation is advised. Soft  tissue pelvic MRI is advised as both benign and malignant soft tissue  abnormalities can have this appearance.      Please let pt know the US couldn't confirm this was a lipoma so we recommend and MRI for better picture to confirm what we are looking at int eh R buttock region   I have ordered - please confirm its the correct MRI given the location   Schedule and we can follow up after   Thanks

## 2024-01-12 NOTE — TELEPHONE ENCOUNTER
Pt notified of result. She actually got the message and went ahead and and scheduled the MRI with jam for Friday 1/26/24 @ 300pm. I told her we need to pre cert first that she should not have this done until she hears back from you. Please do per cert and contact pt. I did not cancel the test due to you may have enough time to get approved before the 26th. thanks

## 2024-01-26 ENCOUNTER — HOSPITAL ENCOUNTER (OUTPATIENT)
Dept: RADIOLOGY | Facility: HOSPITAL | Age: 54
End: 2024-01-26
Payer: COMMERCIAL

## 2024-02-06 ENCOUNTER — HOSPITAL ENCOUNTER (OUTPATIENT)
Dept: RADIOLOGY | Facility: HOSPITAL | Age: 54
End: 2024-02-06
Payer: COMMERCIAL

## 2024-02-06 ENCOUNTER — APPOINTMENT (OUTPATIENT)
Dept: PRIMARY CARE | Facility: CLINIC | Age: 54
End: 2024-02-06
Payer: COMMERCIAL

## 2024-02-13 ENCOUNTER — HOSPITAL ENCOUNTER (OUTPATIENT)
Dept: RADIOLOGY | Facility: HOSPITAL | Age: 54
Discharge: HOME | End: 2024-02-13
Payer: COMMERCIAL

## 2024-02-13 DIAGNOSIS — R22.2 MASS OF BUTTOCK: ICD-10-CM

## 2024-02-13 DIAGNOSIS — M25.551 RIGHT HIP PAIN: ICD-10-CM

## 2024-02-13 DIAGNOSIS — R93.89 ABNORMAL ULTRASOUND: ICD-10-CM

## 2024-02-13 PROCEDURE — 72195 MRI PELVIS W/O DYE: CPT

## 2024-02-13 PROCEDURE — 72195 MRI PELVIS W/O DYE: CPT | Performed by: STUDENT IN AN ORGANIZED HEALTH CARE EDUCATION/TRAINING PROGRAM

## 2024-02-23 ENCOUNTER — TELEPHONE (OUTPATIENT)
Dept: PRIMARY CARE | Facility: CLINIC | Age: 54
End: 2024-02-23
Payer: COMMERCIAL

## 2024-02-23 NOTE — TELEPHONE ENCOUNTER
Received a prior auth request from [employer] for wegovy. Did a PA request in epic. Waiting on questions to generate

## 2024-03-04 ENCOUNTER — APPOINTMENT (OUTPATIENT)
Dept: PRIMARY CARE | Facility: CLINIC | Age: 54
End: 2024-03-04
Payer: COMMERCIAL

## 2024-03-05 ENCOUNTER — TELEPHONE (OUTPATIENT)
Dept: PRIMARY CARE | Facility: CLINIC | Age: 54
End: 2024-03-05

## 2024-03-05 ENCOUNTER — OFFICE VISIT (OUTPATIENT)
Dept: PRIMARY CARE | Facility: CLINIC | Age: 54
End: 2024-03-05
Payer: COMMERCIAL

## 2024-03-05 VITALS
BODY MASS INDEX: 30.64 KG/M2 | SYSTOLIC BLOOD PRESSURE: 118 MMHG | HEART RATE: 68 BPM | WEIGHT: 152 LBS | HEIGHT: 59 IN | DIASTOLIC BLOOD PRESSURE: 72 MMHG

## 2024-03-05 DIAGNOSIS — E66.09 CLASS 1 OBESITY DUE TO EXCESS CALORIES WITH SERIOUS COMORBIDITY AND BODY MASS INDEX (BMI) OF 30.0 TO 30.9 IN ADULT: Primary | ICD-10-CM

## 2024-03-05 DIAGNOSIS — K76.0 FATTY LIVER: ICD-10-CM

## 2024-03-05 DIAGNOSIS — M25.551 RIGHT HIP PAIN: ICD-10-CM

## 2024-03-05 PROCEDURE — 1036F TOBACCO NON-USER: CPT | Performed by: PHYSICIAN ASSISTANT

## 2024-03-05 PROCEDURE — 3008F BODY MASS INDEX DOCD: CPT | Performed by: PHYSICIAN ASSISTANT

## 2024-03-05 PROCEDURE — 99214 OFFICE O/P EST MOD 30 MIN: CPT | Performed by: PHYSICIAN ASSISTANT

## 2024-03-05 ASSESSMENT — ENCOUNTER SYMPTOMS
COUGH: 0
EYE REDNESS: 0
SLEEP DISTURBANCE: 0
DIZZINESS: 0
SINUS PAIN: 0
TREMORS: 0
NECK PAIN: 0
BRUISES/BLEEDS EASILY: 0
RHINORRHEA: 0
WOUND: 0
VOMITING: 0
SORE THROAT: 0
CHILLS: 0
SHORTNESS OF BREATH: 0
ABDOMINAL PAIN: 0
NAUSEA: 0
BACK PAIN: 1
FLANK PAIN: 0
ARTHRALGIAS: 1
PALPITATIONS: 0
NUMBNESS: 0
CHEST TIGHTNESS: 0
WHEEZING: 0
HEADACHES: 0
DIARRHEA: 0
CONSTIPATION: 0
CONFUSION: 0
FREQUENCY: 0
FEVER: 0
FATIGUE: 1
EYE DISCHARGE: 0

## 2024-03-05 ASSESSMENT — PATIENT HEALTH QUESTIONNAIRE - PHQ9
2. FEELING DOWN, DEPRESSED OR HOPELESS: NOT AT ALL
SUM OF ALL RESPONSES TO PHQ9 QUESTIONS 1 AND 2: 1
1. LITTLE INTEREST OR PLEASURE IN DOING THINGS: SEVERAL DAYS
10. IF YOU CHECKED OFF ANY PROBLEMS, HOW DIFFICULT HAVE THESE PROBLEMS MADE IT FOR YOU TO DO YOUR WORK, TAKE CARE OF THINGS AT HOME, OR GET ALONG WITH OTHER PEOPLE: NOT DIFFICULT AT ALL

## 2024-03-05 NOTE — TELEPHONE ENCOUNTER
I did sent script for zepbound to [facility]   1 mo supply  I believe PA will be needed which patient is aware will not be covered but plans to get self pay.  Please call patient and let her know she has a 1 mo supply and if she does start and want to cont this med she will need a follow up in about 3.5-4 weeks after starting this med for a wt check     Thanks

## 2024-03-05 NOTE — PROGRESS NOTES
"Subjective   Patient ID: Irma D Reyes is a 53 y.o. female who presents for Follow-up (F/U ON TEST RESULTS, SLIGHT PAIN DUE TO ARTHRITIS)    HPI  [facility] imaging R hip lump  Patient was seen in Jan and noted lump x 3 weeks   Low back pain   S/P R hip replacement about 2-3 years ago   US was completed and suggested MRI   MRI done in feb and noted -     Probable fat necrosis in the right gluteal subcutaneous tissue with  an adjacent small hematoma more superficially toward the skin surface  corresponding to area of palpable abnormality. Correlate with history  of trauma.      Mild left gluteus medius and minimus insertional tendinosis and left  hamstring origin tendinosis.  She states the  \"lump\" has since resolved and pain has improved as well as she is doing home PT - denies referral at this time     Obese   Pt is wanting to lose weight   discussed diet and ex, referral to dietician, medications  Pt would like to start injectable.  I explained some of the meds are not recommended for her as she is not a diabetic.   wegovey tried but was too expensive given her BMI>27.5 and she does have fatty liver.  She has done research and believes the zepbound is cheaper.  I explained it was a titration medicine and she would be seen routinely for wt checks and med inc in the beginning     med check   iron - not on meds   b12 - not on meds   RA - following with specialists on occasion - - ran our of the sulindac and hydroxyzchlorquine - but will follow up with rheum   elevated LFT/fatty liver- did follow with Dr Bronson   Dyspepsia - on prevacid       Preventative testing   Mammo  PAP   DEXA   Colonoscopy   Fall - NEG Jan 2024   PHQ2 NEG JAN 2024         Patient Active Problem List   Diagnosis    Abnormal chromosomal analysis    Allergic rhinitis    Anxiety    Chronic constipation    Disorder of bone and cartilage    Generalized anxiety disorder    Overweight (BMI 25.0-29.9)    Stenosis of carotid artery    Onychomycosis       Review " of Systems   Constitutional:  Positive for fatigue. Negative for chills and fever.   HENT:  Negative for congestion, rhinorrhea, sinus pain, sore throat and tinnitus.    Eyes:  Negative for discharge, redness and visual disturbance.   Respiratory:  Negative for cough, chest tightness, shortness of breath and wheezing.    Cardiovascular:  Negative for chest pain, palpitations and leg swelling.   Gastrointestinal:  Negative for abdominal pain, constipation, diarrhea, nausea and vomiting.   Endocrine: Negative for cold intolerance and heat intolerance.   Genitourinary:  Negative for flank pain, frequency and urgency.   Musculoskeletal:  Positive for arthralgias and back pain. Negative for gait problem and neck pain.   Skin:  Negative for rash and wound.   Neurological:  Negative for dizziness, tremors, syncope, numbness and headaches.   Hematological:  Does not bruise/bleed easily.   Psychiatric/Behavioral:  Negative for confusion, sleep disturbance and suicidal ideas.        Past Medical History:   Diagnosis Date    Bilateral foot pain 07/01/2019    Bilateral hand pain 07/01/2019    Bilateral hip pain 07/01/2019    Bilateral wrist pain 07/01/2019    Cervicovaginal cytology specimen unsatisfactory 09/25/2023    Chronic abdominal pain 09/25/2023    Chronic low back pain 12/13/2021    Dry eyes 03/02/2022    Elevated ferritin 09/25/2023    Elevated LFTs 09/25/2023    Elevated vitamin B12 level 09/25/2023    Encounter for gynecological examination (general) (routine) without abnormal findings     Pap test, as part of routine gynecological examination    External hemorrhoids 12/13/2021    Fatigue 07/01/2019    Fatty liver 07/01/2019    Fibromyalgia 09/25/2023    H/O total hip arthroplasty, right 03/02/2022    Hypertriglyceridemia 12/13/2021    Immunization not carried out because of patient refusal     Influenza vaccination declined    Lumbar degenerative disc disease 07/01/2019    Lumbar radiculopathy 09/25/2023    MCCULLOUGH  (nonalcoholic steatohepatitis) 07/01/2019    Noncompliance 09/16/2020    Osteoarthritis of right hip 09/25/2023    Other conditions influencing health status     History of pregnancy    Peroneal tendonitis of right lower extremity 09/03/2021    Postmenopausal bleeding 12/13/2021    Premature atrial contraction 12/13/2021    Primary osteoarthritis of both knees 07/01/2019    Primary osteoarthritis of left hip 03/02/2022    Rheumatoid arthritis of multiple sites with negative rheumatoid factor (CMS/HCC) 03/02/2022    Right sided sciatica 09/25/2023       Past Surgical History:   Procedure Laterality Date    COLONOSCOPY  12/07/2017    POLYP- 3 YRS    ENDOMETRIAL BIOPSY  2018       Family History   Problem Relation Name Age of Onset    Aneurysm Mother      Hypertension Mother      No Known Problems Father      Cervical cancer Other          GRANDPARENT       Social History     Tobacco Use    Smoking status: Never    Smokeless tobacco: Never   Vaping Use    Vaping Use: Never used   Substance Use Topics    Alcohol use: Yes     Comment: occasionally    Drug use: Never       Allergies   Allergen Reactions    Aspirin-Acetaminophen-Caffeine Palpitations    Diphenhydramine Other, Palpitations and Unknown     Anxiety    Other reaction(s): Other: See Comments   Anxiety    Anxiety   Other reaction(s): Other: See Comments   Anxiety    Caffeine Other and Unknown     anxiety    Ciprofloxacin Unknown    Ragweed Unknown       Current Outpatient Medications   Medication Sig Dispense Refill    docusate sodium (Colace) 100 mg capsule Take 1 capsule (100 mg) by mouth twice a day.      fluticasone (Flonase) 50 mcg/actuation nasal spray Administer 1 spray into affected nostril(s) once daily.      hydroxychloroquine (Plaquenil) 200 mg tablet Take 1 tablet (200 mg) by mouth 2 times a day.      lansoprazole (Prevacid) 30 mg DR capsule Take 1 capsule (30 mg) by mouth once daily. 30 capsule 5    sulindac (Clinoril) 200 mg tablet Take 1 tablet  "(200 mg) by mouth every 12 hours.      cyclobenzaprine (Flexeril) 10 mg tablet Take 1 tablet (10 mg) by mouth 2 times a day as needed for muscle spasms.      semaglutide, weight loss, (Wegovy) 0.25 mg/0.5 mL pen injector Inject 0.25 mg under the skin 1 (one) time per week. (Patient not taking: Reported on 3/5/2024) 2 mL 1     No current facility-administered medications for this visit.       Objective   /72   Pulse 68   Ht 1.499 m (4' 11\")   Wt 68.9 kg (152 lb)   BMI 30.70 kg/m²     Physical Exam  Vitals reviewed.   Constitutional:       Appearance: Normal appearance. She is obese.   HENT:      Head: Normocephalic.      Right Ear: External ear normal.      Left Ear: External ear normal.      Nose: Nose normal. No congestion or rhinorrhea.      Mouth/Throat:      Mouth: Mucous membranes are moist.   Eyes:      Extraocular Movements: Extraocular movements intact.      Conjunctiva/sclera: Conjunctivae normal.      Pupils: Pupils are equal, round, and reactive to light.   Cardiovascular:      Rate and Rhythm: Normal rate and regular rhythm.      Pulses: Normal pulses.   Pulmonary:      Effort: Pulmonary effort is normal.      Breath sounds: Normal breath sounds.   Abdominal:      General: Bowel sounds are normal.      Palpations: Abdomen is soft.      Tenderness: There is no abdominal tenderness. There is no right CVA tenderness or left CVA tenderness.   Musculoskeletal:         General: No tenderness. Normal range of motion.      Cervical back: Normal range of motion and neck supple. No tenderness.   Skin:     General: Skin is warm and dry.   Neurological:      General: No focal deficit present.      Mental Status: She is alert and oriented to person, place, and time.   Psychiatric:         Mood and Affect: Mood normal.         Behavior: Behavior normal.         Testing   Reviewed US and MRI     Impression    MDM    1) COMPLEXITY: MORE THAN 1 STABLE CHRONIC CONDITION ADDRESSED  2)DATA: TESTS INTERPRETED AND " OR ORDERED, TOOK INDEPENDENT HISTORY OR RECORDS REVIEWED  3)RISK: MODERATE RISK DUE TO NATURE OF MEDICAL CONDITIONS/COMORBIDITY OR MEDICATIONS ORDERED OR SURGICAL OR PROCEDURE REFERRAL, .       Reviewed labs and Testing on file   Patient to follow diet low in cholesterol, fat, and sodium.    Patient is advised to increase Exercise.  Patient is recommended to lose weight.  Reviewed Meds and discussed common side effects  Continue as directed   Weight concerns - will do trial of zepbound but pt is aware this will be safe pay   Patient is strongly advised to be compliant with recommendations.    Return to Clinic sooner if needed.  Patient denies further questions/concerns at this time     Assessment/Plan   Problem List Items Addressed This Visit             ICD-10-CM    Class 1 obesity due to excess calories with serious comorbidity and body mass index (BMI) of 30.0 to 30.9 in adult - Primary E66.09, Z68.30    Relevant Medications    tirzepatide, weight loss, (Zepbound) 2.5 mg/0.5 mL injection     Other Visit Diagnoses         Codes    Fatty liver     K76.0    Relevant Medications    tirzepatide, weight loss, (Zepbound) 2.5 mg/0.5 mL injection    Right hip pain     M25.551             FU in 1 mo with wt check

## 2024-03-06 NOTE — TELEPHONE ENCOUNTER
PATIENT NOTIFIED AND UNDERSTOOD. F/U APPOINTMENT SCHEDULED 4/1/24. MED DID NEED PRIOR AUTH AND IT WAS SUBMITTED ELECTRONICALLY - STILL WAITING FOR COVERAGE DETERMINATION. PATIENT IS AWARE.

## 2024-04-01 ENCOUNTER — APPOINTMENT (OUTPATIENT)
Dept: PRIMARY CARE | Facility: CLINIC | Age: 54
End: 2024-04-01
Payer: COMMERCIAL

## 2024-09-24 ENCOUNTER — APPOINTMENT (OUTPATIENT)
Dept: PRIMARY CARE | Facility: CLINIC | Age: 54
End: 2024-09-24
Payer: COMMERCIAL

## 2024-10-01 ENCOUNTER — TELEPHONE (OUTPATIENT)
Dept: PRIMARY CARE | Facility: CLINIC | Age: 54
End: 2024-10-01
Payer: COMMERCIAL

## 2024-10-01 DIAGNOSIS — K76.0 FATTY LIVER: ICD-10-CM

## 2024-10-01 DIAGNOSIS — E78.1 HYPERTRIGLYCERIDEMIA: ICD-10-CM

## 2024-10-01 DIAGNOSIS — R79.89 LOW VITAMIN B12 LEVEL: ICD-10-CM

## 2024-10-01 DIAGNOSIS — E66.3 OVERWEIGHT (BMI 25.0-29.9): Primary | ICD-10-CM

## 2024-10-01 DIAGNOSIS — R79.89 ELEVATED FERRITIN: ICD-10-CM

## 2024-10-01 NOTE — TELEPHONE ENCOUNTER
PATIENT LABS ARE  CAN YOU PLEASE UPDATE THEM.  PATIENT IS SCHEDULED IN TWO WEEKS SHE IS GONG TO DO LABS THIS WEEK.

## 2024-10-10 ENCOUNTER — APPOINTMENT (OUTPATIENT)
Dept: PRIMARY CARE | Facility: CLINIC | Age: 54
End: 2024-10-10
Payer: COMMERCIAL

## 2024-10-15 ENCOUNTER — APPOINTMENT (OUTPATIENT)
Dept: PRIMARY CARE | Facility: CLINIC | Age: 54
End: 2024-10-15
Payer: COMMERCIAL

## 2024-10-16 ENCOUNTER — LAB (OUTPATIENT)
Dept: LAB | Facility: LAB | Age: 54
End: 2024-10-16
Payer: COMMERCIAL

## 2024-10-16 DIAGNOSIS — E78.1 HYPERTRIGLYCERIDEMIA: ICD-10-CM

## 2024-10-16 DIAGNOSIS — R79.89 LOW VITAMIN B12 LEVEL: ICD-10-CM

## 2024-10-16 DIAGNOSIS — E66.3 OVERWEIGHT (BMI 25.0-29.9): ICD-10-CM

## 2024-10-16 DIAGNOSIS — R79.89 ELEVATED FERRITIN: ICD-10-CM

## 2024-10-16 LAB
ALBUMIN SERPL BCP-MCNC: 4.4 G/DL (ref 3.4–5)
ALP SERPL-CCNC: 140 U/L (ref 33–110)
ALT SERPL W P-5'-P-CCNC: 65 U/L (ref 7–45)
ANION GAP SERPL CALC-SCNC: 12 MMOL/L (ref 10–20)
AST SERPL W P-5'-P-CCNC: 33 U/L (ref 9–39)
BASOPHILS # BLD AUTO: 0.07 X10*3/UL (ref 0–0.1)
BASOPHILS NFR BLD AUTO: 1 %
BILIRUB SERPL-MCNC: 0.3 MG/DL (ref 0–1.2)
BUN SERPL-MCNC: 23 MG/DL (ref 6–23)
CALCIUM SERPL-MCNC: 9.3 MG/DL (ref 8.6–10.3)
CHLORIDE SERPL-SCNC: 106 MMOL/L (ref 98–107)
CHOLEST SERPL-MCNC: 194 MG/DL (ref 0–199)
CHOLESTEROL/HDL RATIO: 2.9
CO2 SERPL-SCNC: 27 MMOL/L (ref 21–32)
CREAT SERPL-MCNC: 0.76 MG/DL (ref 0.5–1.05)
EGFRCR SERPLBLD CKD-EPI 2021: >90 ML/MIN/1.73M*2
EOSINOPHIL # BLD AUTO: 0.27 X10*3/UL (ref 0–0.7)
EOSINOPHIL NFR BLD AUTO: 3.9 %
ERYTHROCYTE [DISTWIDTH] IN BLOOD BY AUTOMATED COUNT: 11.9 % (ref 11.5–14.5)
FERRITIN SERPL-MCNC: 236 NG/ML (ref 8–150)
GLUCOSE SERPL-MCNC: 103 MG/DL (ref 74–99)
HCT VFR BLD AUTO: 42.6 % (ref 36–46)
HDLC SERPL-MCNC: 66 MG/DL
HGB BLD-MCNC: 13.8 G/DL (ref 12–16)
IMM GRANULOCYTES # BLD AUTO: 0.01 X10*3/UL (ref 0–0.7)
IMM GRANULOCYTES NFR BLD AUTO: 0.1 % (ref 0–0.9)
IRON SATN MFR SERPL: 20 % (ref 25–45)
IRON SERPL-MCNC: 61 UG/DL (ref 35–150)
LDLC SERPL CALC-MCNC: 103 MG/DL
LYMPHOCYTES # BLD AUTO: 2.22 X10*3/UL (ref 1.2–4.8)
LYMPHOCYTES NFR BLD AUTO: 31.9 %
MAGNESIUM SERPL-MCNC: 1.98 MG/DL (ref 1.6–2.4)
MCH RBC QN AUTO: 31.1 PG (ref 26–34)
MCHC RBC AUTO-ENTMCNC: 32.4 G/DL (ref 32–36)
MCV RBC AUTO: 96 FL (ref 80–100)
MONOCYTES # BLD AUTO: 0.56 X10*3/UL (ref 0.1–1)
MONOCYTES NFR BLD AUTO: 8 %
NEUTROPHILS # BLD AUTO: 3.84 X10*3/UL (ref 1.2–7.7)
NEUTROPHILS NFR BLD AUTO: 55.1 %
NON HDL CHOLESTEROL: 128 MG/DL (ref 0–149)
NRBC BLD-RTO: 0 /100 WBCS (ref 0–0)
PLATELET # BLD AUTO: 322 X10*3/UL (ref 150–450)
POTASSIUM SERPL-SCNC: 4.7 MMOL/L (ref 3.5–5.3)
PROT SERPL-MCNC: 7.1 G/DL (ref 6.4–8.2)
RBC # BLD AUTO: 4.44 X10*6/UL (ref 4–5.2)
SODIUM SERPL-SCNC: 140 MMOL/L (ref 136–145)
T4 FREE SERPL-MCNC: 0.83 NG/DL (ref 0.61–1.12)
TIBC SERPL-MCNC: 312 UG/DL (ref 240–445)
TRIGL SERPL-MCNC: 127 MG/DL (ref 0–149)
TSH SERPL-ACNC: 1.54 MIU/L (ref 0.44–3.98)
UIBC SERPL-MCNC: 251 UG/DL (ref 110–370)
VIT B12 SERPL-MCNC: 494 PG/ML (ref 211–911)
VLDL: 25 MG/DL (ref 0–40)
WBC # BLD AUTO: 7 X10*3/UL (ref 4.4–11.3)

## 2024-10-16 PROCEDURE — 83540 ASSAY OF IRON: CPT

## 2024-10-16 PROCEDURE — 36415 COLL VENOUS BLD VENIPUNCTURE: CPT

## 2024-10-16 PROCEDURE — 83550 IRON BINDING TEST: CPT

## 2024-10-16 PROCEDURE — 84443 ASSAY THYROID STIM HORMONE: CPT

## 2024-10-16 PROCEDURE — 82607 VITAMIN B-12: CPT

## 2024-10-16 PROCEDURE — 80061 LIPID PANEL: CPT

## 2024-10-16 PROCEDURE — 80053 COMPREHEN METABOLIC PANEL: CPT

## 2024-10-16 PROCEDURE — 83735 ASSAY OF MAGNESIUM: CPT

## 2024-10-16 PROCEDURE — 82728 ASSAY OF FERRITIN: CPT

## 2024-10-16 PROCEDURE — 84439 ASSAY OF FREE THYROXINE: CPT

## 2024-10-16 PROCEDURE — 85025 COMPLETE CBC W/AUTO DIFF WBC: CPT

## 2024-11-19 ENCOUNTER — TELEPHONE (OUTPATIENT)
Dept: PRIMARY CARE | Facility: CLINIC | Age: 54
End: 2024-11-19
Payer: COMMERCIAL

## 2024-11-19 DIAGNOSIS — Z12.31 ENCOUNTER FOR SCREENING MAMMOGRAM FOR BREAST CANCER: Primary | ICD-10-CM

## 2024-11-19 NOTE — TELEPHONE ENCOUNTER
I called pt and she is willing to do screening mammogram. Please put in order. She is going to call back to schedule. She was at work. Send this back to me please. thanks

## 2024-12-23 ENCOUNTER — HOSPITAL ENCOUNTER (EMERGENCY)
Facility: HOSPITAL | Age: 54
Discharge: HOME | End: 2024-12-23
Attending: EMERGENCY MEDICINE
Payer: COMMERCIAL

## 2024-12-23 ENCOUNTER — APPOINTMENT (OUTPATIENT)
Dept: RADIOLOGY | Facility: HOSPITAL | Age: 54
End: 2024-12-23
Payer: COMMERCIAL

## 2024-12-23 VITALS
RESPIRATION RATE: 18 BRPM | OXYGEN SATURATION: 94 % | SYSTOLIC BLOOD PRESSURE: 151 MMHG | DIASTOLIC BLOOD PRESSURE: 90 MMHG | HEIGHT: 59 IN | WEIGHT: 150 LBS | HEART RATE: 95 BPM | TEMPERATURE: 98.2 F | BODY MASS INDEX: 30.24 KG/M2

## 2024-12-23 DIAGNOSIS — R04.0 EPISTAXIS: Primary | ICD-10-CM

## 2024-12-23 PROCEDURE — 99284 EMERGENCY DEPT VISIT MOD MDM: CPT | Mod: 25 | Performed by: EMERGENCY MEDICINE

## 2024-12-23 PROCEDURE — 70450 CT HEAD/BRAIN W/O DYE: CPT

## 2024-12-23 PROCEDURE — 70450 CT HEAD/BRAIN W/O DYE: CPT | Performed by: RADIOLOGY

## 2024-12-23 ASSESSMENT — PAIN - FUNCTIONAL ASSESSMENT: PAIN_FUNCTIONAL_ASSESSMENT: 0-10

## 2024-12-23 ASSESSMENT — PAIN DESCRIPTION - LOCATION: LOCATION: HEAD

## 2024-12-23 ASSESSMENT — COLUMBIA-SUICIDE SEVERITY RATING SCALE - C-SSRS
1. IN THE PAST MONTH, HAVE YOU WISHED YOU WERE DEAD OR WISHED YOU COULD GO TO SLEEP AND NOT WAKE UP?: NO
2. HAVE YOU ACTUALLY HAD ANY THOUGHTS OF KILLING YOURSELF?: NO
6. HAVE YOU EVER DONE ANYTHING, STARTED TO DO ANYTHING, OR PREPARED TO DO ANYTHING TO END YOUR LIFE?: NO

## 2024-12-23 ASSESSMENT — PAIN SCALES - GENERAL: PAINLEVEL_OUTOF10: 5 - MODERATE PAIN

## 2024-12-23 NOTE — ED PROVIDER NOTES
Chief Complaint:     HPI     Review of Systems     Physical Exam     Labs Reviewed - No data to display     CT head wo IV contrast   Final Result   No evidence of acute cortical infarct or intracranial hemorrhage.                  MACRO:   None             Signed by: Cody Whitney 12/23/2024 6:08 PM   Dictation workstation:   [id number]           Procedures     Medical Decision Making  Seen by Octavio midlevel provider for some nosebleeds.  Her blood pressure was minimally elevated when she first came in.  Nose just some so some minimal crusting on the right side.  CT scan of the brain was negative blood pressure normalized while here in the emergency department.  It was unsure whether this is related to her heat coming on and drying her nose out without any humidification or weather related to transient blood pressure elevations.  Should be discharged to use a humidifier at home place Vaseline to the nose referred to ENT as needed monitor her blood pressure.         Diagnoses as of 12/23/24 1846   Epistaxis                    Tamir Hurley MD  12/23/24 1847

## 2024-12-24 NOTE — ED PROVIDER NOTES
HPI   Chief Complaint   Patient presents with    Headache     Patient reports having headache, epistaxis, ear fullness, lightheaded and anxiety for 3 days       Patient presents with complaint of intermittent nosebleeds for the past few weeks.  She also is concerned with ear fullness and a headache.  She states she has had intermittent ear difficulty for years.  She states when she gets the fullness in her ears it is usually diagnosed as swimmer's ear.  She denies any drainage from her ears.  No trouble hearing.  No dizziness or lightheadedness.  No fever or chills.  No cough or congestion.  No weakness.  No visual changes.      History provided by:  Patient          Patient History   Past Medical History:   Diagnosis Date    Bilateral foot pain 07/01/2019    Bilateral hand pain 07/01/2019    Bilateral hip pain 07/01/2019    Bilateral wrist pain 07/01/2019    Cervicovaginal cytology specimen unsatisfactory 09/25/2023    Chronic abdominal pain 09/25/2023    Chronic low back pain 12/13/2021    Dry eyes 03/02/2022    Elevated ferritin 09/25/2023    Elevated LFTs 09/25/2023    Elevated vitamin B12 level 09/25/2023    Encounter for gynecological examination (general) (routine) without abnormal findings     Pap test, as part of routine gynecological examination    External hemorrhoids 12/13/2021    Fatigue 07/01/2019    Fatty liver 07/01/2019    Fibromyalgia 09/25/2023    H/O total hip arthroplasty, right 03/02/2022    Hypertriglyceridemia 12/13/2021    Immunization not carried out because of patient refusal     Influenza vaccination declined    Lumbar degenerative disc disease 07/01/2019    Lumbar radiculopathy 09/25/2023    MCCULLOUGH (nonalcoholic steatohepatitis) 07/01/2019    Noncompliance 09/16/2020    Osteoarthritis of right hip 09/25/2023    Other conditions influencing health status     History of pregnancy    Peroneal tendonitis of right lower extremity 09/03/2021    Postmenopausal bleeding 12/13/2021    Premature  atrial contraction 12/13/2021    Primary osteoarthritis of both knees 07/01/2019    Primary osteoarthritis of left hip 03/02/2022    Rheumatoid arthritis of multiple sites with negative rheumatoid factor (Multi) 03/02/2022    Right sided sciatica 09/25/2023     Past Surgical History:   Procedure Laterality Date    COLONOSCOPY  12/07/2017    POLYP- 3 YRS    ENDOMETRIAL BIOPSY  2018     Family History   Problem Relation Name Age of Onset    Aneurysm Mother      Hypertension Mother      No Known Problems Father      Cervical cancer Other          GRANDPARENT     Social History     Tobacco Use    Smoking status: Never    Smokeless tobacco: Never   Vaping Use    Vaping status: Never Used   Substance Use Topics    Alcohol use: Yes     Comment: occasionally    Drug use: Never       Physical Exam   ED Triage Vitals [12/23/24 1707]   Temperature Heart Rate Respirations BP   36.8 °C (98.2 °F) 96 18 (!) 152/102      Pulse Ox Temp Source Heart Rate Source Patient Position   96 % Oral -- --      BP Location FiO2 (%)     -- --       Physical Exam  Vitals and nursing note reviewed.   Constitutional:       General: She is not in acute distress.     Appearance: Normal appearance. She is well-developed, well-groomed and normal weight. She is not ill-appearing or toxic-appearing.   HENT:      Head: Normocephalic.      Right Ear: Tympanic membrane, ear canal and external ear normal.      Left Ear: Tympanic membrane, ear canal and external ear normal.      Nose: Nose normal. No rhinorrhea.      Right Nostril: No epistaxis or septal hematoma.      Left Nostril: No epistaxis or septal hematoma.      Right Turbinates: Not enlarged.      Left Turbinates: Not enlarged.      Mouth/Throat:      Lips: Pink. No lesions.      Mouth: Mucous membranes are moist. No oral lesions.      Dentition: No gum lesions.      Tongue: No lesions.      Palate: No mass.      Pharynx: Oropharynx is clear. Uvula midline.      Tonsils: No tonsillar exudate or  tonsillar abscesses.   Eyes:      General: No scleral icterus.     Conjunctiva/sclera: Conjunctivae normal.   Cardiovascular:      Rate and Rhythm: Normal rate and regular rhythm.      Heart sounds: Normal heart sounds.   Pulmonary:      Effort: Pulmonary effort is normal.      Breath sounds: Normal breath sounds and air entry.   Lymphadenopathy:      Cervical: No cervical adenopathy.   Skin:     General: Skin is warm.      Capillary Refill: Capillary refill takes less than 2 seconds.   Neurological:      Mental Status: She is alert and oriented to person, place, and time.      Cranial Nerves: No cranial nerve deficit or facial asymmetry.      Gait: Gait is intact.   Psychiatric:         Attention and Perception: Attention and perception normal.         Mood and Affect: Mood and affect normal.         Speech: Speech normal.         Behavior: Behavior normal. Behavior is cooperative.         Thought Content: Thought content normal.         Cognition and Memory: Cognition and memory normal.         Judgment: Judgment normal.           ED Course & MDM   Diagnoses as of 12/23/24 1918   Epistaxis                 No data recorded     [place] Coma Scale Score: 15 (12/23/24 1708 : Joanne Donahue RN)                           Medical Decision Making  Patient presents with complaint of intermittent nosebleeds for the past few weeks.  She also is concerned with ear fullness and a headache.  She states she has had intermittent ear difficulty for years.  She states when she gets the fullness in her ears it is usually diagnosed as swimmer's ear.  She denies any drainage from her ears.  No trouble hearing.  No dizziness or lightheadedness.  No fever or chills.  No cough or congestion.  No weakness.  No visual changes.    Ddx: Hypertension, epistaxis, cephalgia, sinusitis, nasal polyp, other    Did offer blood work but patient declined.  Stating that she has had blood work recently.  Patient was agreeable to CT of the brain.   Therefore CT was ordered  Patient initially was hypertensive but that seemed to improve throughout ED stay.  CT read by radiologist showing no acute concerns.  Patient was also evaluated by attending.  Patient most likely suffering from nosebleeds due to the change in weather and humidity levels.  Patient encouraged to use a humidifier as well as Vaseline to the nasal passages.  Follow-up with ENT and primary care as directed.  Patient discharged home in improved stable condition    Problems Addressed:  Epistaxis: undiagnosed new problem with uncertain prognosis    Amount and/or Complexity of Data Reviewed  Radiology: ordered and independent interpretation performed. Decision-making details documented in ED Course.    Risk  OTC drugs.  Diagnosis or treatment significantly limited by social determinants of health.        Procedure  Procedures     Saundra Howell PA-C  12/23/24 1918

## 2025-03-13 ENCOUNTER — APPOINTMENT (OUTPATIENT)
Facility: CLINIC | Age: 55
End: 2025-03-13
Payer: COMMERCIAL

## 2025-03-13 ENCOUNTER — APPOINTMENT (OUTPATIENT)
Dept: PRIMARY CARE | Facility: CLINIC | Age: 55
End: 2025-03-13
Payer: COMMERCIAL

## 2025-03-13 VITALS
SYSTOLIC BLOOD PRESSURE: 132 MMHG | HEIGHT: 59 IN | WEIGHT: 156.6 LBS | HEART RATE: 79 BPM | DIASTOLIC BLOOD PRESSURE: 88 MMHG | BODY MASS INDEX: 31.57 KG/M2

## 2025-03-13 DIAGNOSIS — F41.1 GENERALIZED ANXIETY DISORDER: ICD-10-CM

## 2025-03-13 DIAGNOSIS — E66.811 CLASS 1 OBESITY DUE TO EXCESS CALORIES WITH SERIOUS COMORBIDITY AND BODY MASS INDEX (BMI) OF 31.0 TO 31.9 IN ADULT: ICD-10-CM

## 2025-03-13 DIAGNOSIS — I10 BENIGN ESSENTIAL HYPERTENSION: Primary | ICD-10-CM

## 2025-03-13 DIAGNOSIS — M06.9 RHEUMATOID ARTHRITIS, INVOLVING UNSPECIFIED SITE, UNSPECIFIED WHETHER RHEUMATOID FACTOR PRESENT (MULTI): ICD-10-CM

## 2025-03-13 DIAGNOSIS — K76.0 FATTY LIVER: ICD-10-CM

## 2025-03-13 DIAGNOSIS — E66.09 CLASS 1 OBESITY DUE TO EXCESS CALORIES WITH SERIOUS COMORBIDITY AND BODY MASS INDEX (BMI) OF 31.0 TO 31.9 IN ADULT: ICD-10-CM

## 2025-03-13 DIAGNOSIS — R79.89 ELEVATED FERRITIN: ICD-10-CM

## 2025-03-13 DIAGNOSIS — R14.0 BLOATING: ICD-10-CM

## 2025-03-13 DIAGNOSIS — M25.50 POLYARTHRALGIA: ICD-10-CM

## 2025-03-13 DIAGNOSIS — R73.02 GLUCOSE INTOLERANCE (IMPAIRED GLUCOSE TOLERANCE): ICD-10-CM

## 2025-03-13 DIAGNOSIS — K21.9 GASTROESOPHAGEAL REFLUX DISEASE, UNSPECIFIED WHETHER ESOPHAGITIS PRESENT: ICD-10-CM

## 2025-03-13 DIAGNOSIS — H93.13 TINNITUS OF BOTH EARS: ICD-10-CM

## 2025-03-13 PROCEDURE — 1036F TOBACCO NON-USER: CPT | Performed by: PHYSICIAN ASSISTANT

## 2025-03-13 PROCEDURE — 3075F SYST BP GE 130 - 139MM HG: CPT | Performed by: PHYSICIAN ASSISTANT

## 2025-03-13 PROCEDURE — 3008F BODY MASS INDEX DOCD: CPT | Performed by: PHYSICIAN ASSISTANT

## 2025-03-13 PROCEDURE — 3079F DIAST BP 80-89 MM HG: CPT | Performed by: PHYSICIAN ASSISTANT

## 2025-03-13 PROCEDURE — 99214 OFFICE O/P EST MOD 30 MIN: CPT | Performed by: PHYSICIAN ASSISTANT

## 2025-03-13 RX ORDER — MECLIZINE HYDROCHLORIDE 25 MG/1
25 TABLET ORAL 3 TIMES DAILY PRN
Qty: 30 TABLET | Refills: 0 | Status: SHIPPED | OUTPATIENT
Start: 2025-03-13 | End: 2026-03-13

## 2025-03-13 RX ORDER — HYDROCHLOROTHIAZIDE 25 MG/1
25 TABLET ORAL DAILY PRN
Qty: 30 TABLET | Refills: 2 | Status: SHIPPED | OUTPATIENT
Start: 2025-03-13

## 2025-03-13 RX ORDER — LANSOPRAZOLE 30 MG/1
30 CAPSULE, DELAYED RELEASE ORAL DAILY
Qty: 30 CAPSULE | Refills: 5 | Status: SHIPPED | OUTPATIENT
Start: 2025-03-13

## 2025-03-13 RX ORDER — CYCLOBENZAPRINE HCL 10 MG
10 TABLET ORAL 2 TIMES DAILY PRN
Qty: 30 TABLET | Refills: 0 | Status: SHIPPED | OUTPATIENT
Start: 2025-03-13

## 2025-03-13 ASSESSMENT — ENCOUNTER SYMPTOMS
CHEST TIGHTNESS: 0
FREQUENCY: 0
TREMORS: 0
FEVER: 0
VOMITING: 0
RHINORRHEA: 0
WOUND: 0
PALPITATIONS: 0
CONFUSION: 0
HEADACHES: 1
NAUSEA: 0
SINUS PAIN: 0
EYE DISCHARGE: 0
SHORTNESS OF BREATH: 0
DIZZINESS: 1
NECK PAIN: 0
NUMBNESS: 0
SLEEP DISTURBANCE: 0
WHEEZING: 0
COUGH: 0
ABDOMINAL PAIN: 0
ARTHRALGIAS: 1
CONSTIPATION: 0
MYALGIAS: 1
DIARRHEA: 0
FLANK PAIN: 0
CHILLS: 0
EYE REDNESS: 0
SORE THROAT: 0
BACK PAIN: 1
FATIGUE: 1
BRUISES/BLEEDS EASILY: 0

## 2025-03-13 NOTE — PROGRESS NOTES
Subjective   Patient ID: Irma D Reyes is a 55 y.o. female who presents for Follow-up (FOLLOW UP, LABS, MED CK)    HPI    Labs - done fall 2024     Dizziness/ear concerns Menieres? - Bilat ear pressure, tinnitus, dec hearing   Previously she was on Hydrochlorathiazide PRN and felt this worked well.  We discussed consider antivert prn as well or follow up with ENT /neuro again       GI concerns   Pt notes after eating she is getting really bloated and uncomfortable   Pt states she had simialr symptoms years ago and did well when she fasted   We discussed the importance of diet   Will start back on PPI   Consider updated EGD/colon    Pain   Rheumatoid - currently not seeing specialists and off meds but plans to re schedule given how she is feeling.    We discussed poor diet will inc her symptoms as well     Obese   Pt is wanting to lose weight   discussed diet and ex, referral to dietician, medications  Previously meds tried were not covered       med check   iron - not on meds   b12 - not on meds   RA - following with specialists on occasion - - ran our of the sulindac and hydroxyzchlorquine - but will follow up with rheum   elevated LFT/fatty liver- did follow with Dr Bronson   Dyspepsia - on omeprazole - will change to  prevacid        Preventative testing   Mammo- declines   PAP   DEXA   Colonoscopy 2017 - repeat in 3 years - pt would like to further discuss at next visit as she may want to do with EGD   Fall - NEG MARCH 2025    PHQ2 NEG JMArch 2025      Patient Active Problem List   Diagnosis    Abnormal chromosomal analysis    Allergic rhinitis    Anxiety    Chronic constipation    Disorder of bone and cartilage    Generalized anxiety disorder    Class 1 obesity due to excess calories with serious comorbidity and body mass index (BMI) of 30.0 to 30.9 in adult    Stenosis of carotid artery    Onychomycosis       Review of Systems   Constitutional:  Positive for fatigue. Negative for chills and fever.   HENT:   Positive for ear pain. Negative for congestion, rhinorrhea, sinus pain, sore throat and tinnitus.    Eyes:  Negative for discharge, redness and visual disturbance.   Respiratory:  Negative for cough, chest tightness, shortness of breath and wheezing.    Cardiovascular:  Negative for chest pain, palpitations and leg swelling.   Gastrointestinal:  Negative for abdominal pain, constipation, diarrhea, nausea and vomiting.   Endocrine: Negative for cold intolerance and heat intolerance.   Genitourinary:  Negative for flank pain, frequency and urgency.   Musculoskeletal:  Positive for arthralgias, back pain and myalgias. Negative for gait problem and neck pain.   Skin:  Negative for rash and wound.   Neurological:  Positive for dizziness and headaches. Negative for tremors, syncope and numbness.   Hematological:  Does not bruise/bleed easily.   Psychiatric/Behavioral:  Negative for confusion, sleep disturbance and suicidal ideas.        Past Medical History:   Diagnosis Date    Bilateral foot pain 07/01/2019    Bilateral hand pain 07/01/2019    Bilateral hip pain 07/01/2019    Bilateral wrist pain 07/01/2019    Cervicovaginal cytology specimen unsatisfactory 09/25/2023    Chronic abdominal pain 09/25/2023    Chronic low back pain 12/13/2021    Dry eyes 03/02/2022    Elevated ferritin 09/25/2023    Elevated LFTs 09/25/2023    Elevated vitamin B12 level 09/25/2023    Encounter for gynecological examination (general) (routine) without abnormal findings     Pap test, as part of routine gynecological examination    External hemorrhoids 12/13/2021    Fatigue 07/01/2019    Fatty liver 07/01/2019    Fibromyalgia 09/25/2023    H/O total hip arthroplasty, right 03/02/2022    Hypertriglyceridemia 12/13/2021    Immunization not carried out because of patient refusal     Influenza vaccination declined    Lumbar degenerative disc disease 07/01/2019    Lumbar radiculopathy 09/25/2023    MCCULLOUGH (nonalcoholic steatohepatitis) 07/01/2019     Noncompliance 09/16/2020    Osteoarthritis of right hip 09/25/2023    Other conditions influencing health status     History of pregnancy    Peroneal tendonitis of right lower extremity 09/03/2021    Postmenopausal bleeding 12/13/2021    Premature atrial contraction 12/13/2021    Primary osteoarthritis of both knees 07/01/2019    Primary osteoarthritis of left hip 03/02/2022    Rheumatoid arthritis of multiple sites with negative rheumatoid factor (Multi) 03/02/2022    Right sided sciatica 09/25/2023       Past Surgical History:   Procedure Laterality Date    COLONOSCOPY  12/07/2017    POLYP- 3 YRS    ENDOMETRIAL BIOPSY  2018       Family History   Problem Relation Name Age of Onset    Aneurysm Mother      Hypertension Mother      No Known Problems Father      Cervical cancer Other          GRANDPARENT       Social History     Tobacco Use    Smoking status: Never    Smokeless tobacco: Never   Vaping Use    Vaping status: Never Used   Substance Use Topics    Alcohol use: Yes     Comment: occasionally    Drug use: Never       Allergies   Allergen Reactions    Aspirin-Acetaminophen-Caffeine Palpitations    Diphenhydramine Other, Palpitations and Unknown     Anxiety    Other reaction(s): Other: See Comments   Anxiety    Anxiety   Other reaction(s): Other: See Comments   Anxiety    Caffeine Other and Unknown     anxiety    Ciprofloxacin Unknown    Ragweed Unknown       Current Outpatient Medications   Medication Sig Dispense Refill    docusate sodium (Colace) 100 mg capsule Take 1 capsule (100 mg) by mouth twice a day.      fluticasone (Flonase) 50 mcg/actuation nasal spray Administer 1 spray into affected nostril(s) once daily.      cyclobenzaprine (Flexeril) 10 mg tablet Take 1 tablet (10 mg) by mouth 2 times a day as needed for muscle spasms. (Patient not taking: Reported on 3/13/2025)      hydroxychloroquine (Plaquenil) 200 mg tablet Take 1 tablet (200 mg) by mouth 2 times a day. (Patient not taking: Reported on  "3/13/2025)      lansoprazole (Prevacid) 30 mg DR capsule Take 1 capsule (30 mg) by mouth once daily. (Patient not taking: Reported on 3/13/2025) 30 capsule 5    sulindac (Clinoril) 200 mg tablet Take 1 tablet (200 mg) by mouth every 12 hours. (Patient not taking: Reported on 3/13/2025)      tirzepatide, weight loss, (Zepbound) 2.5 mg/0.5 mL injection Inject 2.5 mg under the skin every 7 days. (Patient not taking: Reported on 3/13/2025) 4 each 0     No current facility-administered medications for this visit.       Objective   /88   Pulse 79   Ht 1.499 m (4' 11\")   Wt 71 kg (156 lb 9.6 oz)   BMI 31.63 kg/m²     Physical Exam  Vitals reviewed.   Constitutional:       Appearance: Normal appearance. She is obese.   HENT:      Head: Normocephalic.      Right Ear: Tympanic membrane and external ear normal. There is no impacted cerumen.      Left Ear: Tympanic membrane and external ear normal. There is no impacted cerumen.      Nose: Nose normal. No congestion or rhinorrhea.      Mouth/Throat:      Mouth: Mucous membranes are moist.   Eyes:      Extraocular Movements: Extraocular movements intact.      Conjunctiva/sclera: Conjunctivae normal.      Pupils: Pupils are equal, round, and reactive to light.   Cardiovascular:      Rate and Rhythm: Normal rate and regular rhythm.      Pulses: Normal pulses.   Pulmonary:      Effort: Pulmonary effort is normal.      Breath sounds: Normal breath sounds.   Abdominal:      General: Bowel sounds are normal.      Palpations: Abdomen is soft.      Tenderness: There is no abdominal tenderness. There is no right CVA tenderness or left CVA tenderness.   Musculoskeletal:         General: Tenderness present. Normal range of motion.      Cervical back: Normal range of motion and neck supple. No tenderness.   Skin:     General: Skin is warm and dry.   Neurological:      General: No focal deficit present.      Mental Status: She is alert and oriented to person, place, and time. "   Psychiatric:         Mood and Affect: Mood normal.         Behavior: Behavior normal.       Testing     Component      Latest Ref Rng 10/16/2024   WBC      4.4 - 11.3 x10*3/uL 7.0    nRBC      0.0 - 0.0 /100 WBCs 0.0    RBC      4.00 - 5.20 x10*6/uL 4.44    HEMOGLOBIN      12.0 - 16.0 g/dL 13.8    HEMATOCRIT      36.0 - 46.0 % 42.6    MCV      80 - 100 fL 96    MCH      26.0 - 34.0 pg 31.1    MCHC      32.0 - 36.0 g/dL 32.4    RED CELL DISTRIBUTION WIDTH      11.5 - 14.5 % 11.9    Platelets      150 - 450 x10*3/uL 322    Neutrophils %      40.0 - 80.0 % 55.1    Immature Granulocytes %, Automated      0.0 - 0.9 % 0.1    Lymphocytes %      13.0 - 44.0 % 31.9    Monocytes %      2.0 - 10.0 % 8.0    Eosinophils %      0.0 - 6.0 % 3.9    Basophils %      0.0 - 2.0 % 1.0    Neutrophils Absolute      1.20 - 7.70 x10*3/uL 3.84    Immature Granulocytes Absolute, Automated      0.00 - 0.70 x10*3/uL 0.01    Lymphocytes Absolute      1.20 - 4.80 x10*3/uL 2.22    Monocytes Absolute      0.10 - 1.00 x10*3/uL 0.56    Eosinophils Absolute      0.00 - 0.70 x10*3/uL 0.27    Basophils Absolute      0.00 - 0.10 x10*3/uL 0.07    GLUCOSE      74 - 99 mg/dL 103 (H)    SODIUM      136 - 145 mmol/L 140    POTASSIUM      3.5 - 5.3 mmol/L 4.7    CHLORIDE      98 - 107 mmol/L 106    Bicarbonate      21 - 32 mmol/L 27    Anion Gap      10 - 20 mmol/L 12    Blood Urea Nitrogen      6 - 23 mg/dL 23    Creatinine      0.50 - 1.05 mg/dL 0.76    EGFR      >60 mL/min/1.73m*2 >90    Calcium      8.6 - 10.3 mg/dL 9.3    Albumin      3.4 - 5.0 g/dL 4.4    Alkaline Phosphatase      33 - 110 U/L 140 (H)    Total Protein      6.4 - 8.2 g/dL 7.1    AST      9 - 39 U/L 33    Bilirubin Total      0.0 - 1.2 mg/dL 0.3    ALT      7 - 45 U/L 65 (H)    CHOLESTEROL      0 - 199 mg/dL 194    HDL CHOLESTEROL      mg/dL 66.0    Cholesterol/HDL Ratio 2.9    LDL Calculated      <=99 mg/dL 103 (H)    VLDL      0 - 40 mg/dL 25    TRIGLYCERIDES      0 - 149 mg/dL 127     Non HDL Cholesterol      0 - 149 mg/dL 128    IRON      35 - 150 ug/dL 61    UIBC      110 - 370 ug/dL 251    TIBC      240 - 445 ug/dL 312    % Saturation      25 - 45 % 20 (L)    Thyroid Stimulating Hormone      0.44 - 3.98 mIU/L 1.54    Thyroxine, Free      0.61 - 1.12 ng/dL 0.83    FERRITIN      8 - 150 ng/mL 236 (H)    MAGNESIUM      1.60 - 2.40 mg/dL 1.98    Vitamin B12      211 - 911 pg/mL 494     glucose intolerance - pt to work on diet and ex   Hyerchol = pt to work on diet and ex  Elvated ferritin - likely reactive but discussed repeat /monitor     Impression    MDM    1) COMPLEXITY: MORE THAN 1 STABLE CHRONIC CONDITION ADDRESSED  2)DATA: TESTS INTERPRETED AND OR ORDERED, TOOK INDEPENDENT HISTORY OR RECORDS REVIEWED  3)RISK: MODERATE RISK DUE TO NATURE OF MEDICAL CONDITIONS/COMORBIDITY OR MEDICATIONS ORDERED OR SURGICAL OR PROCEDURE REFERRAL, .       Reviewed labs and Testing on file   Patient to follow diet low in cholesterol, fat, and sodium.    Patient is advised to increase Exercise.  Patient is recommended to lose weight.  Reviewed Meds and discussed common side effects  Continue as directed   Ears - discussed hydrochlorathiazde daily x 1 week then prn and see if this helps with the dizzy and inflammation symptoms   Patient is strongly advised to be compliant with recommendations.    Return to Clinic sooner if needed.  Patient denies further questions/concerns at this time     Assessment/Plan   Problem List Items Addressed This Visit             ICD-10-CM    Elevated ferritin R79.89    Relevant Orders    Iron and TIBC    Ferritin    Fatty liver K76.0    Generalized anxiety disorder F41.1    Class 1 obesity due to excess calories with serious comorbidity and body mass index (BMI) of 31.0 to 31.9 in adult E66.811, E66.09, Z68.31    Gastroesophageal reflux disease K21.9    Relevant Medications    lansoprazole (Prevacid) 30 mg DR capsule    Tinnitus of both ears H93.13    Relevant Medications     meclizine (Antivert) 25 mg tablet    hydroCHLOROthiazide (HYDRODiuril) 25 mg tablet    Benign essential hypertension - Primary I10    Relevant Medications    hydroCHLOROthiazide (HYDRODiuril) 25 mg tablet    Other Relevant Orders    CBC and Auto Differential    Glucose intolerance (impaired glucose tolerance) R73.02    Relevant Orders    Comprehensive Metabolic Panel    Hemoglobin A1C    Polyarthralgia M25.50    Relevant Medications    cyclobenzaprine (Flexeril) 10 mg tablet     Other Visit Diagnoses         Codes    Rheumatoid arthritis, involving unspecified site, unspecified whether rheumatoid factor present (Multi)     M06.9    Relevant Medications    cyclobenzaprine (Flexeril) 10 mg tablet    Bloating     R14.0          FU in 1-2 mo with med check   Print lab results from oct

## 2025-03-20 ENCOUNTER — APPOINTMENT (OUTPATIENT)
Facility: CLINIC | Age: 55
End: 2025-03-20
Payer: COMMERCIAL

## 2025-04-04 DIAGNOSIS — I10 BENIGN ESSENTIAL HYPERTENSION: ICD-10-CM

## 2025-04-04 DIAGNOSIS — H93.13 TINNITUS OF BOTH EARS: ICD-10-CM

## 2025-04-07 RX ORDER — HYDROCHLOROTHIAZIDE 25 MG/1
25 TABLET ORAL DAILY PRN
Qty: 90 TABLET | Refills: 0 | Status: SHIPPED | OUTPATIENT
Start: 2025-04-07

## 2025-04-10 ENCOUNTER — TELEPHONE (OUTPATIENT)
Dept: PRIMARY CARE | Facility: CLINIC | Age: 55
End: 2025-04-10

## 2025-04-10 ENCOUNTER — APPOINTMENT (OUTPATIENT)
Facility: CLINIC | Age: 55
End: 2025-04-10
Payer: COMMERCIAL

## 2025-04-10 VITALS — WEIGHT: 150 LBS | HEIGHT: 59 IN | BODY MASS INDEX: 30.24 KG/M2

## 2025-04-10 DIAGNOSIS — M06.9 RHEUMATOID ARTHRITIS, INVOLVING UNSPECIFIED SITE, UNSPECIFIED WHETHER RHEUMATOID FACTOR PRESENT (MULTI): Primary | ICD-10-CM

## 2025-04-10 DIAGNOSIS — Z01.419 ENCOUNTER FOR GYNECOLOGICAL EXAMINATION WITHOUT ABNORMAL FINDING: ICD-10-CM

## 2025-04-10 PROCEDURE — 3008F BODY MASS INDEX DOCD: CPT | Performed by: REGISTERED NURSE

## 2025-04-10 PROCEDURE — 99396 PREV VISIT EST AGE 40-64: CPT | Performed by: REGISTERED NURSE

## 2025-04-10 PROCEDURE — 1036F TOBACCO NON-USER: CPT | Performed by: REGISTERED NURSE

## 2025-04-10 SDOH — ECONOMIC STABILITY: INCOME INSECURITY: IN THE LAST 12 MONTHS, WAS THERE A TIME WHEN YOU WERE NOT ABLE TO PAY THE MORTGAGE OR RENT ON TIME?: NO

## 2025-04-10 ASSESSMENT — ENCOUNTER SYMPTOMS
CONSTITUTIONAL NEGATIVE: 1
PSYCHIATRIC NEGATIVE: 1

## 2025-04-10 ASSESSMENT — ANXIETY QUESTIONNAIRES
4. TROUBLE RELAXING: NOT AT ALL
GAD7 TOTAL SCORE: 0
2. NOT BEING ABLE TO STOP OR CONTROL WORRYING: NOT AT ALL
1. FEELING NERVOUS, ANXIOUS, OR ON EDGE: NOT AT ALL
IF YOU CHECKED OFF ANY PROBLEMS ON THIS QUESTIONNAIRE, HOW DIFFICULT HAVE THESE PROBLEMS MADE IT FOR YOU TO DO YOUR WORK, TAKE CARE OF THINGS AT HOME, OR GET ALONG WITH OTHER PEOPLE: NOT DIFFICULT AT ALL
7. FEELING AFRAID AS IF SOMETHING AWFUL MIGHT HAPPEN: NOT AT ALL
3. WORRYING TOO MUCH ABOUT DIFFERENT THINGS: NOT AT ALL
5. BEING SO RESTLESS THAT IT IS HARD TO SIT STILL: NOT AT ALL
6. BECOMING EASILY ANNOYED OR IRRITABLE: NOT AT ALL

## 2025-04-10 ASSESSMENT — COLUMBIA-SUICIDE SEVERITY RATING SCALE - C-SSRS
6. HAVE YOU EVER DONE ANYTHING, STARTED TO DO ANYTHING, OR PREPARED TO DO ANYTHING TO END YOUR LIFE?: NO
1. IN THE PAST MONTH, HAVE YOU WISHED YOU WERE DEAD OR WISHED YOU COULD GO TO SLEEP AND NOT WAKE UP?: NO
2. HAVE YOU ACTUALLY HAD ANY THOUGHTS OF KILLING YOURSELF?: NO

## 2025-04-10 NOTE — PROGRESS NOTES
"Subjective   Patient ID: Irma D Reyes is a 55 y.o. female who presents for New Patient Visit (NP here for yearly exam. LMP-perimenopause.  Last pap 07/20/2021 pt has order placed for mammogram(11/20/2024) pt would like to discuss menopause medications. Pt aslo c/o weight gain and pelvic pressure at times. Last pap 07/20/2021. ).  HPI  Pt. Reports LMP 10 years ago. States she has recently noticed that she has a feeling of being unusually warm about 1 time per month. Pt. 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 Pt. Also attributes weight gain to eating in response to anxiety symptoms Pt. States she \"doesn't like to take medicine\" and declines any medication or therapy for anxiety symptoms. Pt. States exercise helps with this but states her joint pain has been worse lately due to her RA. Sees rheumatologist. Pt. Reports \"pelvic pressure\" that started after she did a treatment for urinary urgency that involved pelvic floor stimulation. Denies any leaking or sensation of prolapse. Denies vaginal dryness.   Review of Systems   Constitutional: Negative.    Genitourinary: Negative.    Psychiatric/Behavioral: Negative.         Objective   Physical Exam  Constitutional:       Appearance: Normal appearance.   Pulmonary:      Effort: Pulmonary effort is normal.   Neurological:      Mental Status: She is alert and oriented to person, place, and time.   Psychiatric:         Mood and Affect: Mood normal.         Assessment/Plan     Disc. Possibility of treatment for anxiety and pt. Declines this  Schedule mammogram  RTO PRN and Pap 2026  Follow up with PCP for routine bloodwork if not already done         Medina Joe, EBER-CNM, APRDEXTER-CNP, DNP 04/10/25 11:36 AM   "

## 2025-04-10 NOTE — TELEPHONE ENCOUNTER
PATIENT NEEDS NEW REFERRAL FOR RHEUMATOLOGY.  SHE SEES DR ANGULO AND IT HAS BEEN OVER 3 YEARS SINCE SHE HAS BEEN THERE AND NEEDS A NEW REFERRAL.     PLEASE ADVISE

## 2025-04-18 ENCOUNTER — TELEPHONE (OUTPATIENT)
Dept: PRIMARY CARE | Facility: CLINIC | Age: 55
End: 2025-04-18
Payer: COMMERCIAL

## 2025-04-18 NOTE — TELEPHONE ENCOUNTER
Pt called wanting to let you know she is possibly starting an injection called NAD and a diet program possibly may start ozempic?

## 2025-05-20 ENCOUNTER — TELEPHONE (OUTPATIENT)
Dept: PRIMARY CARE | Facility: CLINIC | Age: 55
End: 2025-05-20

## 2025-05-20 ENCOUNTER — APPOINTMENT (OUTPATIENT)
Dept: PRIMARY CARE | Facility: CLINIC | Age: 55
End: 2025-05-20
Payer: COMMERCIAL

## 2025-05-20 VITALS
HEART RATE: 60 BPM | HEIGHT: 59 IN | DIASTOLIC BLOOD PRESSURE: 88 MMHG | WEIGHT: 153 LBS | SYSTOLIC BLOOD PRESSURE: 138 MMHG | BODY MASS INDEX: 30.84 KG/M2

## 2025-05-20 DIAGNOSIS — E66.09 CLASS 1 OBESITY DUE TO EXCESS CALORIES WITH SERIOUS COMORBIDITY AND BODY MASS INDEX (BMI) OF 30.0 TO 30.9 IN ADULT: ICD-10-CM

## 2025-05-20 DIAGNOSIS — I10 BENIGN ESSENTIAL HYPERTENSION: ICD-10-CM

## 2025-05-20 DIAGNOSIS — M25.50 POLYARTHRALGIA: ICD-10-CM

## 2025-05-20 DIAGNOSIS — H93.13 TINNITUS OF BOTH EARS: ICD-10-CM

## 2025-05-20 DIAGNOSIS — E66.811 CLASS 1 OBESITY DUE TO EXCESS CALORIES WITH SERIOUS COMORBIDITY AND BODY MASS INDEX (BMI) OF 30.0 TO 30.9 IN ADULT: ICD-10-CM

## 2025-05-20 DIAGNOSIS — Z12.31 ENCOUNTER FOR SCREENING MAMMOGRAM FOR BREAST CANCER: ICD-10-CM

## 2025-05-20 DIAGNOSIS — R23.2 HOT FLASHES: ICD-10-CM

## 2025-05-20 DIAGNOSIS — R42 DIZZINESS: Primary | ICD-10-CM

## 2025-05-20 DIAGNOSIS — R68.82 DECREASED LIBIDO: ICD-10-CM

## 2025-05-20 DIAGNOSIS — Z82.49 FAMILY HISTORY OF BRAIN ANEURYSM: ICD-10-CM

## 2025-05-20 DIAGNOSIS — G44.201 INTRACTABLE TENSION-TYPE HEADACHE, UNSPECIFIED CHRONICITY PATTERN: ICD-10-CM

## 2025-05-20 PROCEDURE — 99214 OFFICE O/P EST MOD 30 MIN: CPT | Performed by: PHYSICIAN ASSISTANT

## 2025-05-20 PROCEDURE — 3075F SYST BP GE 130 - 139MM HG: CPT | Performed by: PHYSICIAN ASSISTANT

## 2025-05-20 PROCEDURE — 1036F TOBACCO NON-USER: CPT | Performed by: PHYSICIAN ASSISTANT

## 2025-05-20 PROCEDURE — 3079F DIAST BP 80-89 MM HG: CPT | Performed by: PHYSICIAN ASSISTANT

## 2025-05-20 PROCEDURE — 3008F BODY MASS INDEX DOCD: CPT | Performed by: PHYSICIAN ASSISTANT

## 2025-05-20 ASSESSMENT — ENCOUNTER SYMPTOMS
ABDOMINAL PAIN: 0
RHINORRHEA: 0
NECK PAIN: 0
DIARRHEA: 0
BRUISES/BLEEDS EASILY: 0
FLANK PAIN: 0
SINUS PAIN: 0
MYALGIAS: 1
FEVER: 0
PALPITATIONS: 0
EYE REDNESS: 0
SLEEP DISTURBANCE: 0
EYE DISCHARGE: 0
WOUND: 0
BACK PAIN: 0
WHEEZING: 0
DIZZINESS: 1
VOMITING: 0
NAUSEA: 0
CONFUSION: 0
TREMORS: 0
COUGH: 0
ARTHRALGIAS: 1
FREQUENCY: 0
HEADACHES: 0
NUMBNESS: 0
CHILLS: 0
CHEST TIGHTNESS: 0
CONSTIPATION: 0
SORE THROAT: 0
SHORTNESS OF BREATH: 0
FATIGUE: 1

## 2025-05-20 ASSESSMENT — PATIENT HEALTH QUESTIONNAIRE - PHQ9
1. LITTLE INTEREST OR PLEASURE IN DOING THINGS: NOT AT ALL
SUM OF ALL RESPONSES TO PHQ9 QUESTIONS 1 AND 2: 0
2. FEELING DOWN, DEPRESSED OR HOPELESS: NOT AT ALL

## 2025-05-20 NOTE — PROGRESS NOTES
Subjective   Patient ID: Irma D Reyes is a 55 y.o. female who presents for Follow-up (2 MO MED AND EAR CHECK. DISCUSS MENOPAUSE )    HPI    Labs - not done      Pt is getting semgilutatide from the clinic   PT is taking NAD supplement and feels this is helping as well      Dizziness/ear concerns Menieres? - Bilat ear pressure, tinnitus, dec hearing   Previously she was on Hydrochlorathiazide PRN and felt this worked well.  We discussed consider antivert prn as well or follow up with ENT /neuro again   I explained this is something that typically can come and go - discussed taking meds prn or routinely seasonally pending her symptoms and triggers     She is also concerned of brain aneurysm with some of the dizziness and HA symptoms rodolfo as she has fam hx of aneurysm      Pain   Rheumatoid - set to follow up in the future   We discussed poor diet will inc her symptoms as well        Menopausal symptoms   Pt states she stopped having periods when 44 and was told she was perimenopausal and pending supplement would sometimes have a period or spotting.  She feels her symptoms have shifted even more and questions being post menopausal but states she hasn't gotten an exact answer from the GYN and denies recent labs.  She notes many symptoms some are common teresa/post menopausal symptoms but some are not typical and may indicate another underlying concerns.  I explained I can order gen labs but pending her concerns she needs to follow with GYN or discuss with someone new for second opinion.  She denies wanting treatment      med check   iron - not on meds   b12 - not on meds   RA - following with specialists on occasion - - ran our of the sulindac and hydroxyzchlorquine - but will follow up with rheum   elevated LFT/fatty liver- did follow with Dr Bronson   Dyspepsia - on omeprazole - will change to  prevacid        Preventative testing   Mammo-   PAP   DEXA   Colonoscopy 2017 - repeat in 3 years - pt would like to further  "discuss at next visit as she may want to do with EGD   Fall - NEG MARCH 2025    PHQ2 NEG Arch 2025      Problem List[1]    Review of Systems   Constitutional:  Positive for fatigue. Negative for chills and fever.   HENT:  Positive for ear pain. Negative for congestion, rhinorrhea, sinus pain, sore throat and tinnitus.    Eyes:  Negative for discharge, redness and visual disturbance.   Respiratory:  Negative for cough, chest tightness, shortness of breath and wheezing.    Cardiovascular:  Negative for chest pain, palpitations and leg swelling.   Gastrointestinal:  Negative for abdominal pain, constipation, diarrhea, nausea and vomiting.   Endocrine: Positive for heat intolerance. Negative for cold intolerance.   Genitourinary:  Negative for flank pain, frequency and urgency.   Musculoskeletal:  Positive for arthralgias and myalgias. Negative for back pain, gait problem and neck pain.   Skin:  Negative for rash and wound.   Neurological:  Positive for dizziness. Negative for tremors, syncope, numbness and headaches.   Hematological:  Does not bruise/bleed easily.   Psychiatric/Behavioral:  Negative for confusion, sleep disturbance and suicidal ideas.        Medical History[2]    Surgical History[3]    Family History[4]    Social History[5]    Allergies[6]    Current Medications[7]    Objective   /88   Pulse 60   Ht 1.499 m (4' 11\")   Wt 69.4 kg (153 lb)   BMI 30.90 kg/m²     Physical Exam  Vitals reviewed.   Constitutional:       Appearance: Normal appearance. She is obese.   HENT:      Head: Normocephalic.      Right Ear: External ear normal.      Left Ear: External ear normal.      Nose: Nose normal. No congestion or rhinorrhea.      Mouth/Throat:      Mouth: Mucous membranes are moist.   Eyes:      Extraocular Movements: Extraocular movements intact.      Conjunctiva/sclera: Conjunctivae normal.      Pupils: Pupils are equal, round, and reactive to light.   Cardiovascular:      Rate and Rhythm: Normal " rate and regular rhythm.      Pulses: Normal pulses.   Pulmonary:      Effort: Pulmonary effort is normal.      Breath sounds: Normal breath sounds.   Abdominal:      General: Bowel sounds are normal.      Palpations: Abdomen is soft.      Tenderness: There is no abdominal tenderness. There is no right CVA tenderness or left CVA tenderness.   Musculoskeletal:         General: No tenderness. Normal range of motion.      Cervical back: Normal range of motion and neck supple. No tenderness.   Skin:     General: Skin is warm and dry.   Neurological:      General: No focal deficit present.      Mental Status: She is alert and oriented to person, place, and time.   Psychiatric:         Mood and Affect: Mood normal.         Behavior: Behavior normal.       Testing   Reviewed old labs on file  Reviewed labs ordered to be done now and will add new labs to be done as well       Impression    MDM    1) COMPLEXITY: 1 UNDIAGNOSED NEW PROBLEM WITH UNCERTAIN PROGNOSIS  2)DATA: TESTS INTERPRETED AND OR ORDERED, TOOK INDEPENDENT HISTORY OR RECORDS REVIEWED  3)RISK: MODERATE RISK DUE TO NATURE OF MEDICAL CONDITIONS/COMORBIDITY OR MEDICATIONS ORDERED OR SURGICAL OR PROCEDURE REFERRAL, .     Reviewed labs and Testing on file   Patient to follow diet low in cholesterol, fat, and sodium.    Patient is advised to increase Exercise.  Patient is recommended to lose weight.  Reviewed Meds and discussed common side effects  Continue as directed   Patient is strongly advised to be compliant with recommendations.    Return to Clinic sooner if needed.  Patient denies further questions/concerns at this time     Assessment/Plan   Problem List Items Addressed This Visit           ICD-10-CM    Class 1 obesity due to excess calories with serious comorbidity and body mass index (BMI) of 30.0 to 30.9 in adult E66.811, E66.09, Z68.30    Tinnitus of both ears H93.13    Relevant Orders    MR angio head w and wo IV contrast    Benign essential hypertension  I10    Polyarthralgia M25.50     Other Visit Diagnoses         Codes      Dizziness    -  Primary R42    Relevant Orders    MR angio head w and wo IV contrast      Hot flashes     R23.2    Relevant Orders    Estrogens, Total    FSH    Progesterone    Luteinizing hormone    DHEA level    Testosterone, total and free    Thyroid Stimulating Hormone    T4, free      Decreased libido     R68.82    Relevant Orders    Estrogens, Total    FSH    Progesterone    Luteinizing hormone    DHEA level    Testosterone, total and free    Thyroid Stimulating Hormone    T4, free      Encounter for screening mammogram for breast cancer     Z12.31    Relevant Orders    BI mammo bilateral screening tomosynthesis      Family history of brain aneurysm     Z82.49    Relevant Orders    MR angio head w and wo IV contrast      Intractable tension-type headache, unspecified chronicity pattern     G44.201    Relevant Orders    MR angio head w and wo IV contrast             FU in 1-2 mo with labs at [facility] fasting and med check   MRA brain - HA, dizziness, fam hx of brain aneurysm           [1]   Patient Active Problem List  Diagnosis    Abnormal chromosomal analysis    Allergic rhinitis    Anxiety    Chronic constipation    Disorder of bone and cartilage    Elevated ferritin    Fatty liver    Generalized anxiety disorder    Class 1 obesity due to excess calories with serious comorbidity and body mass index (BMI) of 31.0 to 31.9 in adult    Stenosis of carotid artery    Onychomycosis    Gastroesophageal reflux disease    Tinnitus of both ears    Benign essential hypertension    Glucose intolerance (impaired glucose tolerance)    Polyarthralgia   [2]   Past Medical History:  Diagnosis Date    Bilateral foot pain 07/01/2019    Bilateral hand pain 07/01/2019    Bilateral hip pain 07/01/2019    Bilateral wrist pain 07/01/2019    Cervicovaginal cytology specimen unsatisfactory 09/25/2023    Chronic abdominal pain 09/25/2023    Chronic low back pain  12/13/2021    Dry eyes 03/02/2022    Elevated ferritin 09/25/2023    Elevated LFTs 09/25/2023    Elevated vitamin B12 level 09/25/2023    Encounter for gynecological examination (general) (routine) without abnormal findings     Pap test, as part of routine gynecological examination    External hemorrhoids 12/13/2021    Fatigue 07/01/2019    Fatty liver 07/01/2019    Fibromyalgia 09/25/2023    H/O total hip arthroplasty, right 03/02/2022    Hypertriglyceridemia 12/13/2021    Immunization not carried out because of patient refusal     Influenza vaccination declined    Lumbar degenerative disc disease 07/01/2019    Lumbar radiculopathy 09/25/2023    Migraine     MCCULLOUGH (nonalcoholic steatohepatitis) 07/01/2019    Noncompliance 09/16/2020    Osteoarthritis of right hip 09/25/2023    Other conditions influencing health status     History of pregnancy    Peroneal tendonitis of right lower extremity 09/03/2021    Postmenopausal bleeding 12/13/2021    Premature atrial contraction 12/13/2021    Primary osteoarthritis of both knees 07/01/2019    Primary osteoarthritis of left hip 03/02/2022    Rheumatoid arthritis of multiple sites with negative rheumatoid factor (Multi) 03/02/2022    Right sided sciatica 09/25/2023   [3]   Past Surgical History:  Procedure Laterality Date    COLONOSCOPY  12/07/2017    POLYP- 3 YRS    ENDOMETRIAL BIOPSY  2018    TOTAL HIP ARTHROPLASTY  2022   [4]   Family History  Problem Relation Name Age of Onset    Aneurysm Mother      Hypertension Mother      Osteoporosis Mother      No Known Problems Father      Uterine cancer Maternal Grandmother      Cervical cancer Other          GRANDPARENT   [5]   Social History  Tobacco Use    Smoking status: Never    Smokeless tobacco: Never   Vaping Use    Vaping status: Never Used   Substance Use Topics    Alcohol use: Yes     Comment: occasionally    Drug use: Never   [6]   Allergies  Allergen Reactions    Aspirin-Acetaminophen-Caffeine Palpitations     Diphenhydramine Other, Palpitations and Unknown     Anxiety    Other reaction(s): Other: See Comments   Anxiety    Anxiety   Other reaction(s): Other: See Comments   Anxiety    Caffeine Other and Unknown     anxiety    Ciprofloxacin Unknown    Ragweed Unknown   [7]   Current Outpatient Medications   Medication Sig Dispense Refill    cyclobenzaprine (Flexeril) 10 mg tablet Take 1 tablet (10 mg) by mouth 2 times a day as needed for muscle spasms. 30 tablet 0    docusate sodium (Colace) 100 mg capsule Take 1 capsule (100 mg) by mouth twice a day.      fluticasone (Flonase) 50 mcg/actuation nasal spray Administer 1 spray into affected nostril(s) once daily.      hydroCHLOROthiazide (HYDRODiuril) 25 mg tablet TAKE 1 TABLET (25 MG) BY MOUTH ONCE DAILY AS NEEDED (EAR PRESSURE/SWELLING). 90 tablet 0    lansoprazole (Prevacid) 30 mg DR capsule Take 1 capsule (30 mg) by mouth once daily. 30 capsule 5    meclizine (Antivert) 25 mg tablet Take 1 tablet (25 mg) by mouth 3 times a day as needed for dizziness. 30 tablet 0     No current facility-administered medications for this visit.

## 2025-05-25 ENCOUNTER — HOSPITAL ENCOUNTER (EMERGENCY)
Facility: HOSPITAL | Age: 55
Discharge: HOME | End: 2025-05-26
Attending: EMERGENCY MEDICINE
Payer: COMMERCIAL

## 2025-05-25 DIAGNOSIS — I10 HYPERTENSION, UNSPECIFIED TYPE: Primary | ICD-10-CM

## 2025-05-25 PROCEDURE — 99283 EMERGENCY DEPT VISIT LOW MDM: CPT | Performed by: EMERGENCY MEDICINE

## 2025-05-25 PROCEDURE — 99284 EMERGENCY DEPT VISIT MOD MDM: CPT

## 2025-05-25 ASSESSMENT — PAIN SCALES - GENERAL: PAINLEVEL_OUTOF10: 1

## 2025-05-25 ASSESSMENT — COLUMBIA-SUICIDE SEVERITY RATING SCALE - C-SSRS
2. HAVE YOU ACTUALLY HAD ANY THOUGHTS OF KILLING YOURSELF?: NO
6. HAVE YOU EVER DONE ANYTHING, STARTED TO DO ANYTHING, OR PREPARED TO DO ANYTHING TO END YOUR LIFE?: NO
1. IN THE PAST MONTH, HAVE YOU WISHED YOU WERE DEAD OR WISHED YOU COULD GO TO SLEEP AND NOT WAKE UP?: NO

## 2025-05-25 ASSESSMENT — PAIN - FUNCTIONAL ASSESSMENT: PAIN_FUNCTIONAL_ASSESSMENT: 0-10

## 2025-05-25 ASSESSMENT — PAIN DESCRIPTION - DESCRIPTORS: DESCRIPTORS: BURNING

## 2025-05-25 ASSESSMENT — PAIN DESCRIPTION - ORIENTATION: ORIENTATION: RIGHT

## 2025-05-26 ENCOUNTER — APPOINTMENT (OUTPATIENT)
Dept: CARDIOLOGY | Facility: HOSPITAL | Age: 55
End: 2025-05-26
Payer: COMMERCIAL

## 2025-05-26 VITALS
TEMPERATURE: 97.4 F | OXYGEN SATURATION: 97 % | WEIGHT: 149 LBS | BODY MASS INDEX: 30.04 KG/M2 | RESPIRATION RATE: 16 BRPM | HEART RATE: 79 BPM | DIASTOLIC BLOOD PRESSURE: 103 MMHG | SYSTOLIC BLOOD PRESSURE: 136 MMHG | HEIGHT: 59 IN

## 2025-05-26 LAB
ANION GAP SERPL CALC-SCNC: 12 MMOL/L (ref 10–20)
BASOPHILS # BLD AUTO: 0.06 X10*3/UL (ref 0–0.1)
BASOPHILS NFR BLD AUTO: 0.6 %
BUN SERPL-MCNC: 18 MG/DL (ref 6–23)
CALCIUM SERPL-MCNC: 9.8 MG/DL (ref 8.6–10.3)
CARDIAC TROPONIN I PNL SERPL HS: 3 NG/L (ref 0–13)
CHLORIDE SERPL-SCNC: 102 MMOL/L (ref 98–107)
CO2 SERPL-SCNC: 27 MMOL/L (ref 21–32)
CREAT SERPL-MCNC: 0.8 MG/DL (ref 0.5–1.05)
EGFRCR SERPLBLD CKD-EPI 2021: 88 ML/MIN/1.73M*2
EOSINOPHIL # BLD AUTO: 0.23 X10*3/UL (ref 0–0.7)
EOSINOPHIL NFR BLD AUTO: 2.5 %
ERYTHROCYTE [DISTWIDTH] IN BLOOD BY AUTOMATED COUNT: 11.9 % (ref 11.5–14.5)
GLUCOSE SERPL-MCNC: 104 MG/DL (ref 74–99)
HCT VFR BLD AUTO: 43.2 % (ref 36–46)
HGB BLD-MCNC: 14.9 G/DL (ref 12–16)
IMM GRANULOCYTES # BLD AUTO: 0.02 X10*3/UL (ref 0–0.7)
IMM GRANULOCYTES NFR BLD AUTO: 0.2 % (ref 0–0.9)
LYMPHOCYTES # BLD AUTO: 2.99 X10*3/UL (ref 1.2–4.8)
LYMPHOCYTES NFR BLD AUTO: 32.1 %
MCH RBC QN AUTO: 32 PG (ref 26–34)
MCHC RBC AUTO-ENTMCNC: 34.5 G/DL (ref 32–36)
MCV RBC AUTO: 93 FL (ref 80–100)
MONOCYTES # BLD AUTO: 0.72 X10*3/UL (ref 0.1–1)
MONOCYTES NFR BLD AUTO: 7.7 %
NEUTROPHILS # BLD AUTO: 5.3 X10*3/UL (ref 1.2–7.7)
NEUTROPHILS NFR BLD AUTO: 56.9 %
NRBC BLD-RTO: 0 /100 WBCS (ref 0–0)
PLATELET # BLD AUTO: 328 X10*3/UL (ref 150–450)
POTASSIUM SERPL-SCNC: 3.5 MMOL/L (ref 3.5–5.3)
RBC # BLD AUTO: 4.65 X10*6/UL (ref 4–5.2)
SODIUM SERPL-SCNC: 137 MMOL/L (ref 136–145)
WBC # BLD AUTO: 9.3 X10*3/UL (ref 4.4–11.3)

## 2025-05-26 PROCEDURE — 84484 ASSAY OF TROPONIN QUANT: CPT | Performed by: EMERGENCY MEDICINE

## 2025-05-26 PROCEDURE — 36415 COLL VENOUS BLD VENIPUNCTURE: CPT | Performed by: EMERGENCY MEDICINE

## 2025-05-26 PROCEDURE — 85025 COMPLETE CBC W/AUTO DIFF WBC: CPT | Performed by: EMERGENCY MEDICINE

## 2025-05-26 PROCEDURE — 93005 ELECTROCARDIOGRAM TRACING: CPT

## 2025-05-26 PROCEDURE — 80048 BASIC METABOLIC PNL TOTAL CA: CPT | Performed by: EMERGENCY MEDICINE

## 2025-05-26 NOTE — ED PROVIDER NOTES
55-year-old female presents with a chief complaint of fluctuating blood pressure throughout the day.  She states at 1 point the systolic went up to 190.  She also reported feeling of palpitations cold hands and cold feet.  Does report a history of anxiety but states she had a good day today.  Denies any chest pain or radiating symptoms to extremity neck or jaw.  No shortness of breath.  Blood pressure during my initial assessment was 136/94.         Review of Systems     Physical Exam  Vitals and nursing note reviewed.   Constitutional:       Appearance: She is not ill-appearing or toxic-appearing.   HENT:      Head: Normocephalic and atraumatic.      Right Ear: Tympanic membrane normal.      Left Ear: Tympanic membrane normal.      Nose: Nose normal.      Mouth/Throat:      Mouth: Mucous membranes are moist.      Pharynx: No oropharyngeal exudate or posterior oropharyngeal erythema.   Eyes:      Extraocular Movements: Extraocular movements intact.      Conjunctiva/sclera: Conjunctivae normal.      Pupils: Pupils are equal, round, and reactive to light.   Cardiovascular:      Rate and Rhythm: Normal rate and regular rhythm.   Pulmonary:      Effort: Pulmonary effort is normal. No respiratory distress.      Breath sounds: Normal breath sounds. No wheezing, rhonchi or rales.   Abdominal:      General: There is no distension.      Palpations: Abdomen is soft. There is no mass.      Tenderness: There is no abdominal tenderness. There is no guarding.   Musculoskeletal:         General: No deformity. Normal range of motion.      Cervical back: Neck supple. No tenderness.   Skin:     General: Skin is warm and dry.   Neurological:      General: No focal deficit present.      Mental Status: She is alert and oriented to person, place, and time.   Psychiatric:         Mood and Affect: Mood normal.          Labs Reviewed   BASIC METABOLIC PANEL - Abnormal       Result Value    Glucose 104 (*)     Sodium 137      Potassium 3.5       Chloride 102      Bicarbonate 27      Anion Gap 12      Urea Nitrogen 18      Creatinine 0.80      eGFR 88      Calcium 9.8     TROPONIN I, HIGH SENSITIVITY - Normal    Troponin I, High Sensitivity 3      Narrative:     Less than 99th percentile of normal range cutoff-  Female and children under 18 years old <14 ng/L; Male <21 ng/L: Negative  Repeat testing should be performed if clinically indicated.     Female and children under 18 years old 14-50 ng/L; Male 21-50 ng/L:  Consistent with possible cardiac damage and possible increased clinical   risk. Serial measurements may help to assess extent of myocardial damage.     >50 ng/L: Consistent with cardiac damage, increased clinical risk and  myocardial infarction. Serial measurements may help assess extent of   myocardial damage.      NOTE: Children less than 1 year old may have higher baseline troponin   levels and results should be interpreted in conjunction with the overall   clinical context.     NOTE: Troponin I testing is performed using a different   testing methodology at [facility] than at other   [facility]. Direct result comparisons should only   be made within the same method.   CBC WITH AUTO DIFFERENTIAL    WBC 9.3      nRBC 0.0      RBC 4.65      Hemoglobin 14.9      Hematocrit 43.2      MCV 93      MCH 32.0      MCHC 34.5      RDW 11.9      Platelets 328      Neutrophils % 56.9      Immature Granulocytes %, Automated 0.2      Lymphocytes % 32.1      Monocytes % 7.7      Eosinophils % 2.5      Basophils % 0.6      Neutrophils Absolute 5.30      Immature Granulocytes Absolute, Automated 0.02      Lymphocytes Absolute 2.99      Monocytes Absolute 0.72      Eosinophils Absolute 0.23      Basophils Absolute 0.06          No orders to display        Procedures     Medical Decision Making  55-year-old female presents with a chief complaint of fluctuating blood pressure throughout the day.  She states at 1 point the systolic went up to  190.  She also reported feeling of palpitations cold hands and cold feet.  Does report a history of anxiety but states she had a good day today.  Denies any chest pain or radiating symptoms to extremity neck or jaw.  No shortness of breath.  Blood pressure during my initial assessment was 136/94.  Patient is EKG was negative for ischemic changes.  Blood pressure did fluctuate somewhat throughout her stay here.  Her troponin is negative.  Electrolytes and CBC are stable.  Patient can be discharged.    DDx: Anxiety, panic attack, hypertension, electrolyte abnormality, ACS    Amount and/or Complexity of Data Reviewed  ECG/medicine tests: independent interpretation performed.     Details: Sinus rhythm rate of 83, narrow complex, normal axis, no ST elevation or depression, no ectopy.         Diagnoses as of 05/26/25 0142   Hypertension, unspecified type                    Darci Swartz MD  05/26/25 0142

## 2025-05-27 ENCOUNTER — TELEPHONE (OUTPATIENT)
Dept: EMERGENCY MEDICINE | Facility: HOSPITAL | Age: 55
End: 2025-05-27

## 2025-05-27 ENCOUNTER — TELEPHONE (OUTPATIENT)
Dept: PRIMARY CARE | Facility: CLINIC | Age: 55
End: 2025-05-27
Payer: COMMERCIAL

## 2025-05-27 LAB
ATRIAL RATE: 83 BPM
P AXIS: 54 DEGREES
P OFFSET: 195 MS
P ONSET: 141 MS
PR INTERVAL: 160 MS
Q ONSET: 221 MS
QRS COUNT: 13 BEATS
QRS DURATION: 74 MS
QT INTERVAL: 368 MS
QTC CALCULATION(BAZETT): 432 MS
QTC FREDERICIA: 410 MS
R AXIS: 35 DEGREES
T AXIS: 8 DEGREES
T OFFSET: 405 MS
VENTRICULAR RATE: 83 BPM

## 2025-05-27 NOTE — ED NOTES
Pt called in requesting her lab results as she is not able to access [facility]. Labs reviewed with patient     Valorie Weathers, MARK  05/27/25 0206

## 2025-05-27 NOTE — TELEPHONE ENCOUNTER
PT CALLED IN REQUESTING HER THYROID LAB RESULTS. SHE HAD THEM DRAWN ON SUNDAY BUT I DON'T SEE WHERE THEY WEE DRAWN. WILL CALL THEM TOMORROEW

## 2025-05-28 ENCOUNTER — TELEPHONE (OUTPATIENT)
Dept: PRIMARY CARE | Facility: CLINIC | Age: 55
End: 2025-05-28
Payer: COMMERCIAL

## 2025-05-28 NOTE — TELEPHONE ENCOUNTER
SHE WAS SEEN IN THE ER SO THEY DID NOT DRAW OUR LABS. PT INFORMED AND WILL HAVE THE LABS DONE AND F/U IN [facility]

## 2025-05-29 NOTE — TELEPHONE ENCOUNTER
APPROVED AND FAXED ORDER TO [facility] THEY WILL CALL THE PATIENT TO SCHEDULE

## 2025-06-02 ENCOUNTER — TELEPHONE (OUTPATIENT)
Dept: PRIMARY CARE | Facility: CLINIC | Age: 55
End: 2025-06-02
Payer: COMMERCIAL

## 2025-06-02 DIAGNOSIS — R42 DIZZINESS: Primary | ICD-10-CM

## 2025-06-02 DIAGNOSIS — Z82.49 FAMILY HISTORY OF BRAIN ANEURYSM: ICD-10-CM

## 2025-06-02 DIAGNOSIS — G44.201 INTRACTABLE TENSION-TYPE HEADACHE, UNSPECIFIED CHRONICITY PATTERN: ICD-10-CM

## 2025-06-02 DIAGNOSIS — H93.13 TINNITUS OF BOTH EARS: ICD-10-CM

## 2025-06-02 NOTE — TELEPHONE ENCOUNTER
ANNIE WITH [facility] CALLED THEY DON'T DO MRA HEAD W+ W/O. THEY ONLY DO WITHOUT IF THIS IS OKAY THEY NEED A NEW ORDER FAXED TO THEM -[phone]    PHONE [phone]

## 2025-06-17 ENCOUNTER — APPOINTMENT (OUTPATIENT)
Dept: PRIMARY CARE | Facility: CLINIC | Age: 55
End: 2025-06-17
Payer: COMMERCIAL

## 2025-07-07 ENCOUNTER — APPOINTMENT (OUTPATIENT)
Dept: PRIMARY CARE | Facility: CLINIC | Age: 55
End: 2025-07-07
Payer: COMMERCIAL

## 2025-07-10 DIAGNOSIS — I10 BENIGN ESSENTIAL HYPERTENSION: ICD-10-CM

## 2025-07-10 DIAGNOSIS — H93.13 TINNITUS OF BOTH EARS: ICD-10-CM

## 2025-07-10 RX ORDER — HYDROCHLOROTHIAZIDE 25 MG/1
25 TABLET ORAL DAILY PRN
Qty: 90 TABLET | Refills: 0 | Status: SHIPPED | OUTPATIENT
Start: 2025-07-10

## 2025-07-29 ENCOUNTER — APPOINTMENT (OUTPATIENT)
Dept: PRIMARY CARE | Facility: CLINIC | Age: 55
End: 2025-07-29
Payer: COMMERCIAL

## 2025-07-30 ENCOUNTER — APPOINTMENT (OUTPATIENT)
Dept: PRIMARY CARE | Facility: CLINIC | Age: 55
End: 2025-07-30
Payer: COMMERCIAL

## 2025-09-02 ENCOUNTER — APPOINTMENT (OUTPATIENT)
Dept: PRIMARY CARE | Facility: CLINIC | Age: 55
End: 2025-09-02
Payer: COMMERCIAL

## 2025-09-25 ENCOUNTER — APPOINTMENT (OUTPATIENT)
Dept: PRIMARY CARE | Facility: CLINIC | Age: 55
End: 2025-09-25
Payer: COMMERCIAL